# Patient Record
Sex: MALE | Race: WHITE | Employment: FULL TIME | URBAN - NONMETROPOLITAN AREA
[De-identification: names, ages, dates, MRNs, and addresses within clinical notes are randomized per-mention and may not be internally consistent; named-entity substitution may affect disease eponyms.]

---

## 2019-06-22 RX ORDER — LOSARTAN POTASSIUM 25 MG/1
25 TABLET ORAL DAILY
Qty: 90 TABLET | Refills: 1 | Status: SHIPPED | OUTPATIENT
Start: 2019-06-22 | End: 2019-07-18 | Stop reason: SDUPTHER

## 2019-07-18 ENCOUNTER — OFFICE VISIT (OUTPATIENT)
Dept: FAMILY MEDICINE CLINIC | Age: 49
End: 2019-07-18
Payer: COMMERCIAL

## 2019-07-18 VITALS
SYSTOLIC BLOOD PRESSURE: 132 MMHG | HEIGHT: 75 IN | HEART RATE: 72 BPM | WEIGHT: 258 LBS | OXYGEN SATURATION: 98 % | BODY MASS INDEX: 32.08 KG/M2 | DIASTOLIC BLOOD PRESSURE: 86 MMHG | TEMPERATURE: 97.8 F

## 2019-07-18 DIAGNOSIS — I10 ESSENTIAL HYPERTENSION: Primary | ICD-10-CM

## 2019-07-18 PROCEDURE — 99213 OFFICE O/P EST LOW 20 MIN: CPT | Performed by: FAMILY MEDICINE

## 2019-07-18 RX ORDER — MONTELUKAST SODIUM 10 MG/1
10 TABLET ORAL NIGHTLY
COMMUNITY
End: 2019-07-18 | Stop reason: SDUPTHER

## 2019-07-18 RX ORDER — SIMVASTATIN 10 MG
40 TABLET ORAL NIGHTLY
Qty: 90 TABLET | Refills: 1 | Status: SHIPPED | OUTPATIENT
Start: 2019-07-18 | End: 2019-08-17

## 2019-07-18 RX ORDER — SILDENAFIL CITRATE 20 MG/1
20 TABLET ORAL PRN
COMMUNITY
End: 2020-06-23

## 2019-07-18 RX ORDER — MONTELUKAST SODIUM 10 MG/1
10 TABLET ORAL NIGHTLY
Qty: 90 TABLET | Refills: 1 | Status: SHIPPED
Start: 2019-07-18 | End: 2020-03-02

## 2019-07-18 RX ORDER — LOSARTAN POTASSIUM 25 MG/1
50 TABLET ORAL DAILY
Qty: 90 TABLET | Refills: 1 | Status: SHIPPED | OUTPATIENT
Start: 2019-07-18 | End: 2019-08-17

## 2019-07-18 ASSESSMENT — PATIENT HEALTH QUESTIONNAIRE - PHQ9
SUM OF ALL RESPONSES TO PHQ9 QUESTIONS 1 & 2: 0
2. FEELING DOWN, DEPRESSED OR HOPELESS: 0
1. LITTLE INTEREST OR PLEASURE IN DOING THINGS: 0
SUM OF ALL RESPONSES TO PHQ QUESTIONS 1-9: 0
SUM OF ALL RESPONSES TO PHQ QUESTIONS 1-9: 0

## 2019-07-18 NOTE — PROGRESS NOTES
7/18/19    Chief Complaint   Patient presents with    Hypertension        S: patient here for PE of chronic medical problems, med recheck/refill. Changed jobs, getting new ins. No family history on file. Past Surgical History:   Procedure Laterality Date    CARDIAC CATHETERIZATION  2004    LITHOTRIPSY  2005    NASAL SINUS SURGERY      OTHER SURGICAL HISTORY  9/18/2014    FESS [facility] turbinator    SALIVARY GLAND SURGERY  2001    VASECTOMY  JAN 2014       Social History     Tobacco Use    Smoking status: Never Smoker    Smokeless tobacco: Never Used   Substance Use Topics    Alcohol use: Yes     Comment: occasional    Drug use: No       ROS:  No CP, No palpitations,   No sob, No cough,   No abd pain, No heartburn,   No headaches,   No tingling, No numbness, No weakness,   No bowel changes, No hematochezia, No melena,  No bladder changes, No hematuria  No skin rashes, No skin lesions. No vision changes, No hearing changes,   No polyuria, polydipsia, polyphagia. Stable mood. ROS otherwise negative unless as listed in HPI. Chart reviewed and updated where appropriate for PMH, Fam, and Soc Hx. Physical Exam   /86   Pulse 72   Temp 97.8 °F (36.6 °C)   Ht 6' 3\" (1.905 m)   Wt 258 lb (117 kg)   SpO2 98%   BMI 32.25 kg/m²   Wt Readings from Last 3 Encounters:   07/18/19 258 lb (117 kg)   11/05/14 250 lb (113.4 kg)   10/08/14 250 lb (113.4 kg)       Constitutional:    He is oriented to person, place, and time. He appears well-developed and well-nourished. HENT:    Right Ear: Tympanic membrane, external ear and ear canal normal.    Left Ear: Tympanic membrane, external ear and ear canal normal.    Nose: Nose normal.    Mouth/Throat: Oropharynx is clear and moist.   Eyes:    Conjunctivae are normal.    Pupils are equal, round, and reactive to light. EOMI. Neck:    Normal range of motion. No thyromegaly or nodules noted. No bruit.   Cardiovascular:    Normal rate, regular rhythm and normal heart sounds. No murmur. No gallop and no friction rub. Pulmonary/Chest:    Effort normal and breath sounds normal.    No wheezes. No rales or rhonchi. Abdominal:    Soft. Bowel sounds are normal.    No distension. No tenderness. Musculoskeletal:    Normal range of motion. No joint swelling noted. No peripheral edema. Neurological:    He is alert and oriented to person, place, and time. Motor and sensation grossly intact. Normal Gait. Skin:    Skin is warm and dry. No rashes, lesions. Psychiatric:    He has a normal mood and affect. Normal groom and dress. Current Outpatient Medications on File Prior to Visit   Medication Sig Dispense Refill    sildenafil (REVATIO) 20 MG tablet Take 20 mg by mouth as needed      Multiple Vitamins-Minerals (THERAPEUTIC MULTIVITAMIN-MINERALS) tablet Take 1 tablet by mouth daily.  fluticasone (FLONASE) 50 MCG/ACT nasal spray 2 sprays by Nasal route daily. 2 sprays each nostril once daily 1 Bottle 2     No current facility-administered medications on file prior to visit. There is no problem list on file for this patient. Assessment / Waldo Heck was seen today for hypertension. Diagnoses and all orders for this visit:    Essential hypertension    Other orders  -     losartan (COZAAR) 25 MG tablet; Take 2 tablets by mouth daily Instructed to take with sip water am of procedure  -     simvastatin (ZOCOR) 10 MG tablet; Take 4 tablets by mouth nightly  -     montelukast (SINGULAIR) 10 MG tablet; Take 1 tablet by mouth nightly  -     dexamethasone (DECADRON) 0.75 MG tablet; Take 1 tablet by mouth three times daily for 5 days    Reviewed Pmhx, meds, prior labs. Recheck end of year for fbw. No follow-ups on file. Patient counseled to follow up sooner or seek more acute care if symptoms worsening. Electronically signed by Sreekanth Ding DO on 7/18/2019    This note may have been created using dictation software.

## 2019-07-26 ENCOUNTER — TELEPHONE (OUTPATIENT)
Dept: FAMILY MEDICINE CLINIC | Age: 49
End: 2019-07-26

## 2019-08-17 ENCOUNTER — OFFICE VISIT (OUTPATIENT)
Dept: FAMILY MEDICINE CLINIC | Age: 49
End: 2019-08-17
Payer: COMMERCIAL

## 2019-08-17 VITALS
DIASTOLIC BLOOD PRESSURE: 88 MMHG | HEIGHT: 75 IN | HEART RATE: 80 BPM | SYSTOLIC BLOOD PRESSURE: 120 MMHG | BODY MASS INDEX: 32.2 KG/M2 | TEMPERATURE: 98.2 F | WEIGHT: 259 LBS | OXYGEN SATURATION: 99 %

## 2019-08-17 DIAGNOSIS — N64.59 ABNORMAL BREAST TISSUE: Primary | ICD-10-CM

## 2019-08-17 PROCEDURE — 99213 OFFICE O/P EST LOW 20 MIN: CPT | Performed by: FAMILY MEDICINE

## 2019-08-17 RX ORDER — SIMVASTATIN 40 MG
TABLET ORAL
COMMUNITY
Start: 2019-06-26 | End: 2020-03-17 | Stop reason: SDUPTHER

## 2019-08-17 RX ORDER — LOSARTAN POTASSIUM 50 MG/1
50 TABLET ORAL DAILY
COMMUNITY
Start: 2012-01-31 | End: 2019-10-08 | Stop reason: SDUPTHER

## 2019-08-17 ASSESSMENT — ENCOUNTER SYMPTOMS
COLOR CHANGE: 0
ABDOMINAL PAIN: 0
SHORTNESS OF BREATH: 0
CHEST TIGHTNESS: 0
SORE THROAT: 0
ABDOMINAL DISTENTION: 0
VOMITING: 0
BACK PAIN: 0
NAUSEA: 0
CONSTIPATION: 0
PHOTOPHOBIA: 0
DIARRHEA: 0
SINUS PRESSURE: 0
COUGH: 0
EYE REDNESS: 0
SINUS PAIN: 0
EYE ITCHING: 0
BLOOD IN STOOL: 0
EYE PAIN: 0
WHEEZING: 0

## 2019-08-17 NOTE — PROGRESS NOTES
[facility]) Physicians   Internal Medicine     2019  Guanako Escobar : 1970 Sex: male  Age:48 y.o. Chief Complaint   Patient presents with    Breast Problem     left chest is bigger than the right. stung by a jellyfish on left rib 3 weeks ago. HPI  Patient presents to urgent care with c/o stung by jellyfish 3 weeks ago on chest, wife thinks his left chest is now more prominent than right. Patient deos not agree, denies pain in left chest, redness, fever/chills  Review of Systems   Constitutional: Negative for appetite change, fatigue, fever and unexpected weight change. HENT: Negative for congestion, ear discharge, ear pain, hearing loss, mouth sores, postnasal drip, sinus pressure, sinus pain, sneezing and sore throat. Eyes: Negative for photophobia, pain, redness and itching. Respiratory: Negative for cough, chest tightness, shortness of breath and wheezing. Cardiovascular: Negative for chest pain, palpitations and leg swelling. Htn  hyperlipidemia   Gastrointestinal: Negative for abdominal distention, abdominal pain, blood in stool, constipation, diarrhea, nausea and vomiting. Endocrine: Negative for cold intolerance and heat intolerance. Genitourinary: Negative for difficulty urinating, dysuria, frequency, hematuria and urgency. Musculoskeletal: Negative for arthralgias, back pain, joint swelling, myalgias, neck pain and neck stiffness. Skin: Negative for color change, pallor and wound. Allergic/Immunologic: Negative for environmental allergies and food allergies. Neurological: Negative for dizziness, seizures, syncope, weakness, light-headedness and headaches. Hematological: Negative for adenopathy. Psychiatric/Behavioral: Negative for agitation, confusion, dysphoric mood, sleep disturbance and suicidal ideas. The patient is not nervous/anxious and is not hyperactive.           Current Outpatient Medications:     losartan (COZAAR) 50 MG tablet, Encounter   Procedures    US BREAST COMPLETE LEFT     Standing Status:   Future     Standing Expiration Date:   8/17/2020       Jason Bryant MD  8/17/2019  9:11 AM

## 2019-10-08 RX ORDER — LOSARTAN POTASSIUM 50 MG/1
50 TABLET ORAL DAILY
Qty: 30 TABLET | Refills: 0 | Status: SHIPPED | OUTPATIENT
Start: 2019-10-08 | End: 2019-10-09 | Stop reason: SDUPTHER

## 2019-10-09 RX ORDER — LOSARTAN POTASSIUM 50 MG/1
50 TABLET ORAL DAILY
Qty: 90 TABLET | Refills: 1 | Status: SHIPPED
Start: 2019-10-09 | End: 2020-03-17 | Stop reason: SDUPTHER

## 2019-11-08 ENCOUNTER — HOSPITAL ENCOUNTER (OUTPATIENT)
Dept: ULTRASOUND IMAGING | Age: 49
Discharge: HOME | End: 2019-11-08
Payer: COMMERCIAL

## 2019-11-08 DIAGNOSIS — N64.59 ABNORMAL BREAST TISSUE: ICD-10-CM

## 2019-11-08 PROCEDURE — 76641 ULTRASOUND BREAST COMPLETE: CPT

## 2020-03-02 RX ORDER — MONTELUKAST SODIUM 10 MG/1
TABLET ORAL
Qty: 90 TABLET | Refills: 0 | Status: SHIPPED
Start: 2020-03-02 | End: 2020-05-28

## 2020-03-17 RX ORDER — SIMVASTATIN 40 MG
40 TABLET ORAL NIGHTLY
Qty: 90 TABLET | Refills: 1 | Status: SHIPPED
Start: 2020-03-17 | End: 2020-06-23 | Stop reason: SDUPTHER

## 2020-03-17 NOTE — TELEPHONE ENCOUNTER
Jordan calling to get a new script for [employer] sent to [facility]. I have this ready to send. Also asking when you need to see him again?

## 2020-03-18 RX ORDER — LOSARTAN POTASSIUM 50 MG/1
50 TABLET ORAL DAILY
Qty: 90 TABLET | Refills: 1 | Status: SHIPPED
Start: 2020-03-18 | End: 2020-04-06

## 2020-04-06 RX ORDER — LOSARTAN POTASSIUM 50 MG/1
TABLET ORAL
Qty: 90 TABLET | Refills: 3 | Status: SHIPPED
Start: 2020-04-06 | End: 2020-06-23 | Stop reason: SDUPTHER

## 2020-05-28 RX ORDER — MONTELUKAST SODIUM 10 MG/1
TABLET ORAL
Qty: 90 TABLET | Refills: 3 | Status: SHIPPED
Start: 2020-05-28 | End: 2020-06-23

## 2020-06-23 ENCOUNTER — OFFICE VISIT (OUTPATIENT)
Dept: PRIMARY CARE CLINIC | Age: 50
End: 2020-06-23
Payer: COMMERCIAL

## 2020-06-23 VITALS
BODY MASS INDEX: 31.95 KG/M2 | WEIGHT: 257 LBS | SYSTOLIC BLOOD PRESSURE: 120 MMHG | DIASTOLIC BLOOD PRESSURE: 80 MMHG | OXYGEN SATURATION: 96 % | TEMPERATURE: 97.2 F | HEIGHT: 75 IN | HEART RATE: 78 BPM

## 2020-06-23 PROCEDURE — 99214 OFFICE O/P EST MOD 30 MIN: CPT | Performed by: FAMILY MEDICINE

## 2020-06-23 RX ORDER — SIMVASTATIN 40 MG
40 TABLET ORAL NIGHTLY
Qty: 90 TABLET | Refills: 3 | Status: SHIPPED
Start: 2020-06-23 | End: 2020-09-14 | Stop reason: SDUPTHER

## 2020-06-23 RX ORDER — LOSARTAN POTASSIUM 50 MG/1
TABLET ORAL
Qty: 90 TABLET | Refills: 3 | Status: SHIPPED
Start: 2020-06-23 | End: 2021-04-01

## 2020-06-23 ASSESSMENT — ENCOUNTER SYMPTOMS
COUGH: 0
ABDOMINAL PAIN: 0
CONSTIPATION: 0
VOMITING: 0
BACK PAIN: 0
NAUSEA: 0
WHEEZING: 0
DIARRHEA: 0
SHORTNESS OF BREATH: 0

## 2020-06-23 ASSESSMENT — PATIENT HEALTH QUESTIONNAIRE - PHQ9
2. FEELING DOWN, DEPRESSED OR HOPELESS: 0
SUM OF ALL RESPONSES TO PHQ QUESTIONS 1-9: 0
SUM OF ALL RESPONSES TO PHQ9 QUESTIONS 1 & 2: 0
SUM OF ALL RESPONSES TO PHQ QUESTIONS 1-9: 0
1. LITTLE INTEREST OR PLEASURE IN DOING THINGS: 0

## 2020-06-23 NOTE — PROGRESS NOTES
INJECTIONS    Hyperlipidemia     Hypertension     Sinusitis      Past Surgical History:   Procedure Laterality Date    CARDIAC CATHETERIZATION  2004    LITHOTRIPSY  2005    NASAL SINUS SURGERY      OTHER SURGICAL HISTORY  9/18/2014    FESS [facility] turbinator    SALIVARY GLAND SURGERY  2001    VASECTOMY  JAN 2014     History reviewed. No pertinent family history.   Social History     Socioeconomic History    Marital status:      Spouse name: Not on file    Number of children: Not on file    Years of education: Not on file    Highest education level: Not on file   Occupational History    Not on file   Social Needs    Financial resource strain: Not on file    Food insecurity     Worry: Not on file     Inability: Not on file    Transportation needs     Medical: Not on file     Non-medical: Not on file   Tobacco Use    Smoking status: Never Smoker    Smokeless tobacco: Never Used   Substance and Sexual Activity    Alcohol use: Yes     Comment: occasional    Drug use: No    Sexual activity: Not on file   Lifestyle    Physical activity     Days per week: Not on file     Minutes per session: Not on file    Stress: Not on file   Relationships    Social connections     Talks on phone: Not on file     Gets together: Not on file     Attends [employer] service: Not on file     Active member of club or organization: Not on file     Attends meetings of clubs or organizations: Not on file     Relationship status: Not on file    Intimate partner violence     Fear of current or ex partner: Not on file     Emotionally abused: Not on file     Physically abused: Not on file     Forced sexual activity: Not on file   Other Topics Concern    Not on file   Social History Narrative    Not on file       Vitals:    06/23/20 1603   BP: 120/80   Pulse: 78   Temp: 97.2 °F (36.2 °C)   SpO2: 96%   Weight: 257 lb (116.6 kg)   Height: 6' 3\" (1.905 m)       Physical Exam:  Physical Exam  Vitals signs and nursing note

## 2020-08-29 LAB
ALBUMIN SERPL-MCNC: 4.4 G/DL
ALP BLD-CCNC: 74 U/L
ALT SERPL-CCNC: 5.4 U/L
ANION GAP SERPL CALCULATED.3IONS-SCNC: NORMAL MMOL/L
AST SERPL-CCNC: 26 U/L
AVERAGE GLUCOSE: NORMAL
BASOPHILS ABSOLUTE: 237 /ΜL
BASOPHILS RELATIVE PERCENT: 0.9 %
BILIRUB SERPL-MCNC: 0.6 MG/DL (ref 0.1–1.4)
BUN BLDV-MCNC: 13 MG/DL
CALCIUM SERPL-MCNC: 9.8 MG/DL
CHLORIDE BLD-SCNC: 104 MMOL/L
CHOLESTEROL, TOTAL: 136 MG/DL
CHOLESTEROL/HDL RATIO: 3.1
CO2: 31 MMOL/L
CREAT SERPL-MCNC: 0.99 MG/DL
EOSINOPHILS ABSOLUTE: 659 /ΜL
EOSINOPHILS RELATIVE PERCENT: 3.7 %
GFR CALCULATED: 88
GLUCOSE BLD-MCNC: 102 MG/DL
HBA1C MFR BLD: 5.4 %
HCT VFR BLD CALC: 47.6 % (ref 41–53)
HDLC SERPL-MCNC: 44 MG/DL (ref 35–70)
HEMOGLOBIN: 16 G/DL (ref 13.5–17.5)
LDL CHOLESTEROL CALCULATED: 74 MG/DL (ref 0–160)
LYMPHOCYTES ABSOLUTE: 2931 /ΜL
LYMPHOCYTES RELATIVE PERCENT: 39.3 %
MCH RBC QN AUTO: 30 PG
MCHC RBC AUTO-ENTMCNC: 33.6 G/DL
MCV RBC AUTO: 89.1 FL
MONOCYTES ABSOLUTE: 2515 /ΜL
MONOCYTES RELATIVE PERCENT: 10.3 %
NEUTROPHILS ABSOLUTE: 0.3 /ΜL
NEUTROPHILS RELATIVE PERCENT: 45.8 %
NONHDLC SERPL-MCNC: 92 MG/DL
PLATELET # BLD: 229 K/ΜL
PMV BLD AUTO: 9.4 FL
POTASSIUM SERPL-SCNC: 4.7 MMOL/L
RBC # BLD: 58 10^6/ΜL
SODIUM BLD-SCNC: 140 MMOL/L
TOTAL PROTEIN: 7.3
TRIGL SERPL-MCNC: 95 MG/DL
TSH SERPL DL<=0.05 MIU/L-ACNC: 1.79 UIU/ML
URIC ACID: 6.6
VITAMIN D 25-HYDROXY: 34
VITAMIN D2, 25 HYDROXY: NORMAL
VITAMIN D3,25 HYDROXY: NORMAL
VLDLC SERPL CALC-MCNC: NORMAL MG/DL
WBC # BLD: 6.4 10^3/ML

## 2020-09-08 ENCOUNTER — TELEPHONE (OUTPATIENT)
Dept: PRIMARY CARE CLINIC | Age: 50
End: 2020-09-08

## 2020-09-14 RX ORDER — SIMVASTATIN 40 MG
40 TABLET ORAL NIGHTLY
Qty: 90 TABLET | Refills: 1 | Status: SHIPPED
Start: 2020-09-14 | End: 2021-03-14

## 2020-09-14 RX ORDER — SIMVASTATIN 40 MG
40 TABLET ORAL NIGHTLY
Qty: 30 TABLET | Refills: 0 | Status: SHIPPED
Start: 2020-09-14 | End: 2022-02-14

## 2020-09-14 NOTE — TELEPHONE ENCOUNTER
Pt is needing 1 month script of Simvastatin to [facility] which I pended and he also needs 90 days sent to mail away which I could not pend

## 2020-12-03 ENCOUNTER — TELEPHONE (OUTPATIENT)
Dept: FAMILY MEDICINE CLINIC | Age: 50
End: 2020-12-03

## 2020-12-03 RX ORDER — AZITHROMYCIN 250 MG/1
TABLET, FILM COATED ORAL
Qty: 6 TABLET | Refills: 0 | Status: SHIPPED | OUTPATIENT
Start: 2020-12-03 | End: 2020-12-08

## 2020-12-03 RX ORDER — PREDNISONE 10 MG/1
TABLET ORAL
Qty: 30 TABLET | Refills: 0 | Status: SHIPPED | OUTPATIENT
Start: 2020-12-03 | End: 2020-12-15

## 2020-12-21 ENCOUNTER — TELEPHONE (OUTPATIENT)
Dept: PRIMARY CARE CLINIC | Age: 50
End: 2020-12-21

## 2020-12-21 RX ORDER — PANTOPRAZOLE SODIUM 40 MG/1
40 TABLET, DELAYED RELEASE ORAL
Qty: 90 TABLET | Refills: 1 | Status: SHIPPED
Start: 2020-12-21 | End: 2021-06-07

## 2020-12-21 NOTE — TELEPHONE ENCOUNTER
Patient called and stated he Tested psitive for Covid on 12/01. He has not been tested since. He has had Heartburn since then and a pain in his chest.( Feels like Heartburn)  He stated he does not have any SOB. He is having some difficulty swallowing. No Availability and he does not want to go to the [facility] Clin[facility]. He would like to talk to his PCP. Please advise.

## 2020-12-21 NOTE — TELEPHONE ENCOUNTER
Spoke with patient. Not having chest pain and has been going on prior to COVID dx. No symptoms of COVID. Feels like when he eats, foods are getting stuck in distal esophagus. Isn't sure if stress or GERD related. Will start Protonix, check esophagram and then seen patient in January. Please call patient and schedule him in January.

## 2020-12-22 NOTE — TELEPHONE ENCOUNTER
Pt called back is scheduled--Jan 14 @3PM.  Please be advised pt stated he did positive for Covid 19 at the beginning of Dec. He finished his quarantine and is already back to work. Just wanted to make sure the office is aware. PT wants to schedule esophogram test Dr Toya London mentioned below, please contact pt at his direct work no. [phone].       Thank you

## 2020-12-22 NOTE — TELEPHONE ENCOUNTER
Patient called in returning missed phone call. Patient states telephone lines not working at office. Patient request someone to please call him again.

## 2020-12-28 ENCOUNTER — HOSPITAL ENCOUNTER (OUTPATIENT)
Dept: GENERAL RADIOLOGY | Age: 50
Discharge: HOME | End: 2020-12-30
Payer: COMMERCIAL

## 2020-12-28 PROCEDURE — 74220 X-RAY XM ESOPHAGUS 1CNTRST: CPT

## 2020-12-28 PROCEDURE — 2500000003 HC RX 250 WO HCPCS: Performed by: RADIOLOGY

## 2020-12-28 RX ADMIN — BARIUM SULFATE 88 G: 960 POWDER, FOR SUSPENSION ORAL at 09:21

## 2020-12-28 RX ADMIN — BARIUM SULFATE 140 ML: 980 POWDER, FOR SUSPENSION ORAL at 09:21

## 2021-03-13 DIAGNOSIS — E78.2 MIXED HYPERLIPIDEMIA: ICD-10-CM

## 2021-03-14 RX ORDER — SIMVASTATIN 40 MG
TABLET ORAL
Qty: 90 TABLET | Refills: 3 | Status: SHIPPED
Start: 2021-03-14 | End: 2021-06-07 | Stop reason: SDUPTHER

## 2021-04-01 DIAGNOSIS — I10 BENIGN ESSENTIAL HYPERTENSION: ICD-10-CM

## 2021-04-01 RX ORDER — LOSARTAN POTASSIUM 50 MG/1
TABLET ORAL
Qty: 90 TABLET | Refills: 3 | Status: SHIPPED
Start: 2021-04-01 | End: 2022-08-23

## 2021-05-13 ENCOUNTER — PATIENT MESSAGE (OUTPATIENT)
Dept: PRIMARY CARE CLINIC | Age: 51
End: 2021-05-13

## 2021-05-13 DIAGNOSIS — I10 BENIGN ESSENTIAL HYPERTENSION: Primary | ICD-10-CM

## 2021-05-13 DIAGNOSIS — E55.9 VITAMIN D DEFICIENCY: ICD-10-CM

## 2021-05-13 DIAGNOSIS — Z12.5 SCREENING FOR PROSTATE CANCER: ICD-10-CM

## 2021-05-13 DIAGNOSIS — R73.01 IMPAIRED FASTING GLUCOSE: ICD-10-CM

## 2021-05-17 LAB
ALBUMIN SERPL-MCNC: 4.4 G/DL
ALP BLD-CCNC: 67 U/L
ALT SERPL-CCNC: 24 U/L
ANION GAP SERPL CALCULATED.3IONS-SCNC: NORMAL MMOL/L
AST SERPL-CCNC: 21 U/L
AVERAGE GLUCOSE: NORMAL
BASOPHILS ABSOLUTE: 39 /ΜL
BASOPHILS RELATIVE PERCENT: 0.7 %
BILIRUB SERPL-MCNC: 0.6 MG/DL (ref 0.1–1.4)
BILIRUBIN, URINE: NEGATIVE
BLOOD, URINE: NEGATIVE
BUN BLDV-MCNC: 14 MG/DL
CALCIUM SERPL-MCNC: 9.5 MG/DL
CHLORIDE BLD-SCNC: 106 MMOL/L
CHOLESTEROL, TOTAL: 140 MG/DL
CHOLESTEROL/HDL RATIO: 3.3
CLARITY: CLEAR
CO2: 28 MMOL/L
COLOR: YELLOW
CREAT SERPL-MCNC: 0.99 MG/DL
EOSINOPHILS ABSOLUTE: 157 /ΜL
EOSINOPHILS RELATIVE PERCENT: 2.8 %
GFR CALCULATED: 88
GLUCOSE BLD-MCNC: 101 MG/DL
GLUCOSE URINE: NEGATIVE
HBA1C MFR BLD: 5.3 %
HCT VFR BLD CALC: 46.1 % (ref 41–53)
HDLC SERPL-MCNC: 42 MG/DL (ref 35–70)
HEMOGLOBIN: 15.7 G/DL (ref 13.5–17.5)
KETONES, URINE: NEGATIVE
LDL CHOLESTEROL CALCULATED: 79 MG/DL (ref 0–160)
LEUKOCYTE ESTERASE, URINE: NEGATIVE
LYMPHOCYTES ABSOLUTE: 2391 /ΜL
LYMPHOCYTES RELATIVE PERCENT: 42.7 %
MCH RBC QN AUTO: 31 PG
MCHC RBC AUTO-ENTMCNC: 34.1 G/DL
MCV RBC AUTO: 91.1 FL
MONOCYTES ABSOLUTE: 610 /ΜL
MONOCYTES RELATIVE PERCENT: 10.9 %
NEUTROPHILS ABSOLUTE: 2402 /ΜL
NEUTROPHILS RELATIVE PERCENT: 42.9 %
NITRITE, URINE: NEGATIVE
NONHDLC SERPL-MCNC: 98 MG/DL
PDW BLD-RTO: 13.2 %
PH UA: 6 (ref 4.5–8)
PLATELET # BLD: 206 K/ΜL
PMV BLD AUTO: 9.3 FL
POTASSIUM SERPL-SCNC: 4.7 MMOL/L
PROTEIN UA: NEGATIVE
RBC # BLD: 5.06 10^6/ΜL
SODIUM BLD-SCNC: 139 MMOL/L
SPECIFIC GRAVITY, URINE: 1.01
TOTAL PROTEIN: 6.9
TRIGL SERPL-MCNC: 106 MG/DL
TSH SERPL DL<=0.05 MIU/L-ACNC: 2.47 UIU/ML
UROBILINOGEN, URINE: NORMAL
VITAMIN D 25-HYDROXY: 32
VITAMIN D2, 25 HYDROXY: NORMAL
VITAMIN D3,25 HYDROXY: NORMAL
VLDLC SERPL CALC-MCNC: NORMAL MG/DL
WBC # BLD: 5.6 10^3/ML

## 2021-05-18 ENCOUNTER — HOSPITAL ENCOUNTER (EMERGENCY)
Age: 51
Discharge: HOME | End: 2021-05-18
Payer: COMMERCIAL

## 2021-05-18 ENCOUNTER — OFFICE VISIT (OUTPATIENT)
Dept: FAMILY MEDICINE CLINIC | Age: 51
End: 2021-05-18
Payer: COMMERCIAL

## 2021-05-18 VITALS
TEMPERATURE: 96.8 F | HEIGHT: 75 IN | SYSTOLIC BLOOD PRESSURE: 126 MMHG | HEART RATE: 83 BPM | DIASTOLIC BLOOD PRESSURE: 82 MMHG | RESPIRATION RATE: 18 BRPM | WEIGHT: 245 LBS | BODY MASS INDEX: 30.46 KG/M2 | OXYGEN SATURATION: 98 %

## 2021-05-18 DIAGNOSIS — E55.9 VITAMIN D DEFICIENCY: ICD-10-CM

## 2021-05-18 DIAGNOSIS — I10 BENIGN ESSENTIAL HYPERTENSION: ICD-10-CM

## 2021-05-18 DIAGNOSIS — R73.01 IMPAIRED FASTING GLUCOSE: ICD-10-CM

## 2021-05-18 DIAGNOSIS — N39.0 URINARY TRACT INFECTION WITHOUT HEMATURIA, SITE UNSPECIFIED: Primary | ICD-10-CM

## 2021-05-18 PROCEDURE — 99213 OFFICE O/P EST LOW 20 MIN: CPT | Performed by: FAMILY MEDICINE

## 2021-05-18 RX ORDER — CIPROFLOXACIN 500 MG/1
500 TABLET, FILM COATED ORAL 2 TIMES DAILY
Qty: 14 TABLET | Refills: 0 | Status: SHIPPED
Start: 2021-05-18 | End: 2021-05-21 | Stop reason: ALTCHOICE

## 2021-05-18 RX ORDER — MONTELUKAST SODIUM 10 MG/1
TABLET ORAL
COMMUNITY
Start: 2021-02-22 | End: 2021-05-18 | Stop reason: SDUPTHER

## 2021-05-18 RX ORDER — MONTELUKAST SODIUM 10 MG/1
10 TABLET ORAL NIGHTLY
Qty: 90 TABLET | Refills: 1 | Status: SHIPPED
Start: 2021-05-18 | End: 2021-05-24

## 2021-05-18 NOTE — PROGRESS NOTES
21  Devaughn Cranker : 1970 Sex: male  Age: 48 y.o. Assessment and Plan:  Tien Daniel was seen today for abdominal pain. Diagnoses and all orders for this visit:    Urinary tract infection without hematuria, site unspecified  -     ciprofloxacin (CIPRO) 500 MG tablet; Take 1 tablet by mouth 2 times daily for 7 days  He presents a mixed picture. He has left lower quadrant discomfort but no bowel issues with this. Has boring pain in the pelvis which could be indicative of prostatitis,  but the urinanalysis is essentially normal, culture however is pending. We will treat like a urinary tract infection at this time, most likely prostatitis. Follow with Lyn in 7 to 10 days or rechecked immediately if complaints worsen. Return in about 10 days (around 2021). Chief Complaint   Patient presents with    Abdominal Pain     lower right side into groin. . patient stated feels like a pulled muscle        The patient states that they have had abdominal pain for the last 4 days. The pain is described as aching and is located in the [facility] with radiation to pelvis he describes the pain as a dull sensation that pelvic area. He just had blood work Dr. Lico Pulido,  culture of the urine is pending. The patient denies nausea and vomiting. Bowel movements have been normal color and consistency. No diarrhea. No black or bloody stools. The patient denies dysuria, urinary frequency, urgency and or gross hematuria. No flank pain. No fevers or chills. Kidney stones in the past, this pain is not to restrict of that. Review of Systems   Constitutional: Negative for appetite change, fatigue and unexpected weight change. HENT: Negative for congestion, ear pain, hearing loss, sinus pain, sore throat and trouble swallowing. Eyes: Negative for photophobia, pain, discharge and redness. Respiratory: Negative for cough, chest tightness and shortness of breath.     Cardiovascular: Negative for chest pain, palpitations and leg swelling. Gastrointestinal: Positive for abdominal pain. Negative for blood in stool, diarrhea, nausea and vomiting. Endocrine: Negative. Genitourinary: Negative for dysuria, flank pain, frequency and hematuria. Musculoskeletal: Negative for arthralgias, back pain, joint swelling and myalgias. Skin: Negative. Allergic/Immunologic: Negative. Neurological: Negative for dizziness, seizures, syncope, weakness, light-headedness, numbness and headaches. Hematological: Negative for adenopathy. Does not bruise/bleed easily. Psychiatric/Behavioral: Negative. Current Outpatient Medications:     ciprofloxacin (CIPRO) 500 MG tablet, Take 1 tablet by mouth 2 times daily for 7 days, Disp: 14 tablet, Rfl: 0    losartan (COZAAR) 50 MG tablet, TAKE 1 TABLET DAILY, Disp: 90 tablet, Rfl: 3    simvastatin (ZOCOR) 40 MG tablet, TAKE 1 TABLET NIGHTLY, Disp: 90 tablet, Rfl: 3    pantoprazole (PROTONIX) 40 MG tablet, Take 1 tablet by mouth every morning (before breakfast), Disp: 90 tablet, Rfl: 1    simvastatin (ZOCOR) 40 MG tablet, Take 1 tablet by mouth nightly, Disp: 30 tablet, Rfl: 0    Multiple Vitamins-Minerals (THERAPEUTIC MULTIVITAMIN-MINERALS) tablet, Take 1 tablet by mouth daily. , Disp: , Rfl:     montelukast (SINGULAIR) 10 MG tablet, Take 1 tablet by mouth nightly, Disp: 90 tablet, Rfl: 1  No Known Allergies    Past Medical History:   Diagnosis Date    Cervical (neck) region somatic dysfunction 2010    3 HERNIATED DISKS & HAS HAD CORTIZONE INJECTIONS    Hyperlipidemia     Hypertension     Sinusitis      Past Surgical History:   Procedure Laterality Date    CARDIAC CATHETERIZATION  2004    LITHOTRIPSY  2005    NASAL SINUS SURGERY      OTHER SURGICAL HISTORY  9/18/2014    FESS SMF turbinator    SALIVARY GLAND SURGERY  2001    VASECTOMY  JAN 2014     No family history on file.   Social History     Socioeconomic History    Marital status:  Spouse name: Not on file    Number of children: Not on file    Years of education: Not on file    Highest education level: Not on file   Occupational History    Not on file   Tobacco Use    Smoking status: Never Smoker    Smokeless tobacco: Never Used   Substance and Sexual Activity    Alcohol use: Yes     Comment: occasional    Drug use: No    Sexual activity: Not on file   Other Topics Concern    Not on file   Social History Narrative    Not on file     Social Determinants of Health     Financial Resource Strain:     Difficulty of Paying Living Expenses:    Food Insecurity:     Worried About Running Out of Food in the Last Year:     [address] in the Last Year:    Transportation Needs:     Lack of Transportation (Medical):  Lack of Transportation (Non-Medical):    Physical Activity:     Days of Exercise per Week:     Minutes of Exercise per Session:    Stress:     Feeling of Stress :    Social Connections:     Frequency of Communication with Friends and Family:     Frequency of Social Gatherings with Friends and Family:     Attends Sabian[employer] Services:     Active Member of Clubs or Organizations:     Attends Club or Organization Meetings:     Marital Status:    Intimate Partner Violence:     Fear of Current or Ex-Partner:     Emotionally Abused:     Physically Abused:     Sexually Abused:        Vitals:    05/18/21 1640   BP: 126/82   Pulse: 83   Resp: 18   Temp: 96.8 °F (36 °C)   SpO2: 98%   Weight: 245 lb (111.1 kg)   Height: 6' 3\" (1.905 m)       Physical Exam  Vitals and nursing note reviewed. Constitutional:       Appearance: He is well-developed. HENT:      Head: Atraumatic. Right Ear: External ear normal.      Left Ear: External ear normal.      Nose: Nose normal.      Mouth/Throat:      Pharynx: No oropharyngeal exudate. Eyes:      Conjunctiva/sclera: Conjunctivae normal.      Pupils: Pupils are equal, round, and reactive to light.    Neck:      Thyroid: No thyromegaly. Trachea: No tracheal deviation. Cardiovascular:      Rate and Rhythm: Normal rate and regular rhythm. Heart sounds: No murmur heard. No friction rub. No gallop. Pulmonary:      Effort: Pulmonary effort is normal. No respiratory distress. Breath sounds: Normal breath sounds. Abdominal:      General: Bowel sounds are normal.      Palpations: Abdomen is soft. Comments: There is a dull ache on palpation of the right lower quadrant into the groin. No rebound or guarding on palpation. Musculoskeletal:         General: No tenderness or deformity. Normal range of motion. Cervical back: Normal range of motion and neck supple. Lymphadenopathy:      Cervical: No cervical adenopathy. Skin:     General: Skin is warm and dry. Capillary Refill: Capillary refill takes less than 2 seconds. Findings: No rash. Neurological:      Mental Status: He is alert and oriented to person, place, and time. Sensory: No sensory deficit. Motor: No abnormal muscle tone.       Coordination: Coordination normal.      Deep Tendon Reflexes: Reflexes normal.             Seen By:  Ivonne Rucker DO

## 2021-05-19 ASSESSMENT — ENCOUNTER SYMPTOMS
TROUBLE SWALLOWING: 0
PHOTOPHOBIA: 0
EYE DISCHARGE: 0
BACK PAIN: 0
VOMITING: 0
ABDOMINAL PAIN: 1
SINUS PAIN: 0
CHEST TIGHTNESS: 0
COUGH: 0
BLOOD IN STOOL: 0
EYE PAIN: 0
NAUSEA: 0
ALLERGIC/IMMUNOLOGIC NEGATIVE: 1
EYE REDNESS: 0
SHORTNESS OF BREATH: 0
DIARRHEA: 0
SORE THROAT: 0

## 2021-05-21 ENCOUNTER — TELEPHONE (OUTPATIENT)
Dept: PRIMARY CARE CLINIC | Age: 51
End: 2021-05-21

## 2021-05-21 ENCOUNTER — HOSPITAL ENCOUNTER (EMERGENCY)
Age: 51
Discharge: HOME | End: 2021-05-21
Attending: EMERGENCY MEDICINE
Payer: COMMERCIAL

## 2021-05-21 ENCOUNTER — APPOINTMENT (OUTPATIENT)
Dept: CT IMAGING | Age: 51
End: 2021-05-21
Payer: COMMERCIAL

## 2021-05-21 VITALS
OXYGEN SATURATION: 98 % | TEMPERATURE: 98 F | RESPIRATION RATE: 16 BRPM | DIASTOLIC BLOOD PRESSURE: 70 MMHG | SYSTOLIC BLOOD PRESSURE: 146 MMHG | HEART RATE: 85 BPM

## 2021-05-21 DIAGNOSIS — K57.32 DIVERTICULITIS OF COLON: Primary | ICD-10-CM

## 2021-05-21 LAB
ALBUMIN SERPL-MCNC: 4.5 G/DL (ref 3.5–5.2)
ALP BLD-CCNC: 71 U/L (ref 40–129)
ALT SERPL-CCNC: 28 U/L (ref 0–40)
ANION GAP SERPL CALCULATED.3IONS-SCNC: 9 MMOL/L (ref 7–16)
AST SERPL-CCNC: 28 U/L (ref 0–39)
BACTERIA: NORMAL /HPF
BASOPHILS ABSOLUTE: 0.04 E9/L (ref 0–0.2)
BASOPHILS RELATIVE PERCENT: 0.4 % (ref 0–2)
BILIRUB SERPL-MCNC: 0.6 MG/DL (ref 0–1.2)
BILIRUBIN URINE: NEGATIVE
BLOOD, URINE: NEGATIVE
BUN BLDV-MCNC: 12 MG/DL (ref 6–20)
CALCIUM SERPL-MCNC: 10.1 MG/DL (ref 8.6–10.2)
CHLORIDE BLD-SCNC: 103 MMOL/L (ref 98–107)
CLARITY: CLEAR
CO2: 26 MMOL/L (ref 22–29)
COLOR: YELLOW
CREAT SERPL-MCNC: 1 MG/DL (ref 0.7–1.2)
EOSINOPHILS ABSOLUTE: 0.06 E9/L (ref 0.05–0.5)
EOSINOPHILS RELATIVE PERCENT: 0.6 % (ref 0–6)
EPITHELIAL CELLS, UA: NORMAL /HPF
GFR AFRICAN AMERICAN: >60
GFR NON-AFRICAN AMERICAN: >60 ML/MIN/1.73
GLUCOSE BLD-MCNC: 119 MG/DL (ref 74–99)
GLUCOSE URINE: NEGATIVE MG/DL
HCT VFR BLD CALC: 44.4 % (ref 37–54)
HEMOGLOBIN: 15.6 G/DL (ref 12.5–16.5)
IMMATURE GRANULOCYTES #: 0.03 E9/L
IMMATURE GRANULOCYTES %: 0.3 % (ref 0–5)
KETONES, URINE: NEGATIVE MG/DL
LACTIC ACID: 2.1 MMOL/L (ref 0.5–2.2)
LEUKOCYTE ESTERASE, URINE: NEGATIVE
LIPASE: 32 U/L (ref 13–60)
LYMPHOCYTES ABSOLUTE: 1.53 E9/L (ref 1.5–4)
LYMPHOCYTES RELATIVE PERCENT: 14.2 % (ref 20–42)
MCH RBC QN AUTO: 31.8 PG (ref 26–35)
MCHC RBC AUTO-ENTMCNC: 35.1 % (ref 32–34.5)
MCV RBC AUTO: 90.6 FL (ref 80–99.9)
MONOCYTES ABSOLUTE: 0.77 E9/L (ref 0.1–0.95)
MONOCYTES RELATIVE PERCENT: 7.1 % (ref 2–12)
NEUTROPHILS ABSOLUTE: 8.36 E9/L (ref 1.8–7.3)
NEUTROPHILS RELATIVE PERCENT: 77.4 % (ref 43–80)
NITRITE, URINE: NEGATIVE
PDW BLD-RTO: 12.9 FL (ref 11.5–15)
PH UA: 6 (ref 5–9)
PLATELET # BLD: 207 E9/L (ref 130–450)
PMV BLD AUTO: 9.1 FL (ref 7–12)
POTASSIUM SERPL-SCNC: 4.2 MMOL/L (ref 3.5–5)
PROTEIN UA: NEGATIVE MG/DL
RBC # BLD: 4.9 E12/L (ref 3.8–5.8)
RBC UA: NORMAL /HPF (ref 0–2)
SODIUM BLD-SCNC: 138 MMOL/L (ref 132–146)
SPECIFIC GRAVITY UA: 1.02 (ref 1–1.03)
TOTAL PROTEIN: 7.7 G/DL (ref 6.4–8.3)
UROBILINOGEN, URINE: 0.2 E.U./DL
WBC # BLD: 10.8 E9/L (ref 4.5–11.5)
WBC UA: NORMAL /HPF (ref 0–5)

## 2021-05-21 PROCEDURE — 99283 EMERGENCY DEPT VISIT LOW MDM: CPT

## 2021-05-21 PROCEDURE — 85025 COMPLETE CBC W/AUTO DIFF WBC: CPT

## 2021-05-21 PROCEDURE — 36415 COLL VENOUS BLD VENIPUNCTURE: CPT

## 2021-05-21 PROCEDURE — 83690 ASSAY OF LIPASE: CPT

## 2021-05-21 PROCEDURE — 74177 CT ABD & PELVIS W/CONTRAST: CPT

## 2021-05-21 PROCEDURE — 2580000003 HC RX 258: Performed by: EMERGENCY MEDICINE

## 2021-05-21 PROCEDURE — 96374 THER/PROPH/DIAG INJ IV PUSH: CPT

## 2021-05-21 PROCEDURE — 83605 ASSAY OF LACTIC ACID: CPT

## 2021-05-21 PROCEDURE — 6360000002 HC RX W HCPCS: Performed by: EMERGENCY MEDICINE

## 2021-05-21 PROCEDURE — 6360000004 HC RX CONTRAST MEDICATION: Performed by: RADIOLOGY

## 2021-05-21 PROCEDURE — 80053 COMPREHEN METABOLIC PANEL: CPT

## 2021-05-21 PROCEDURE — 81001 URINALYSIS AUTO W/SCOPE: CPT

## 2021-05-21 RX ORDER — CEFDINIR 300 MG/1
300 CAPSULE ORAL 2 TIMES DAILY
Qty: 20 CAPSULE | Refills: 0 | Status: SHIPPED | OUTPATIENT
Start: 2021-05-21 | End: 2021-05-31

## 2021-05-21 RX ORDER — METRONIDAZOLE 500 MG/1
500 TABLET ORAL 3 TIMES DAILY
Qty: 30 TABLET | Refills: 0 | Status: SHIPPED | OUTPATIENT
Start: 2021-05-21 | End: 2021-05-31

## 2021-05-21 RX ORDER — 0.9 % SODIUM CHLORIDE 0.9 %
1000 INTRAVENOUS SOLUTION INTRAVENOUS ONCE
Status: COMPLETED | OUTPATIENT
Start: 2021-05-21 | End: 2021-05-21

## 2021-05-21 RX ORDER — KETOROLAC TROMETHAMINE 30 MG/ML
30 INJECTION, SOLUTION INTRAMUSCULAR; INTRAVENOUS ONCE
Status: COMPLETED | OUTPATIENT
Start: 2021-05-21 | End: 2021-05-21

## 2021-05-21 RX ADMIN — SODIUM CHLORIDE 1000 ML: 9 INJECTION, SOLUTION INTRAVENOUS at 10:47

## 2021-05-21 RX ADMIN — KETOROLAC TROMETHAMINE 30 MG: 30 INJECTION, SOLUTION INTRAMUSCULAR; INTRAVENOUS at 10:47

## 2021-05-21 RX ADMIN — IOPAMIDOL 75 ML: 755 INJECTION, SOLUTION INTRAVENOUS at 11:51

## 2021-05-21 ASSESSMENT — PAIN DESCRIPTION - LOCATION: LOCATION: ABDOMEN

## 2021-05-21 ASSESSMENT — PAIN SCALES - GENERAL: PAINLEVEL_OUTOF10: 10

## 2021-05-21 NOTE — TELEPHONE ENCOUNTER
Please advise where you would like to schedule patient in at. . No availability in [facility] [place] [facility] next week and [facility] there are only same days available. Thank you!

## 2021-05-21 NOTE — ED PROVIDER NOTES
Hematocrit 44.4 37.0 - 54.0 %    MCV 90.6 80.0 - 99.9 fL    MCH 31.8 26.0 - 35.0 pg    MCHC 35.1 (H) 32.0 - 34.5 %    RDW 12.9 11.5 - 15.0 fL    Platelets 476 626 - 524 E9/L    MPV 9.1 7.0 - 12.0 fL    Neutrophils % 77.4 43.0 - 80.0 %    Immature Granulocytes % 0.3 0.0 - 5.0 %    Lymphocytes % 14.2 (L) 20.0 - 42.0 %    Monocytes % 7.1 2.0 - 12.0 %    Eosinophils % 0.6 0.0 - 6.0 %    Basophils % 0.4 0.0 - 2.0 %    Neutrophils Absolute 8.36 (H) 1.80 - 7.30 E9/L    Immature Granulocytes # 0.03 E9/L    Lymphocytes Absolute 1.53 1.50 - 4.00 E9/L    Monocytes Absolute 0.77 0.10 - 0.95 E9/L    Eosinophils Absolute 0.06 0.05 - 0.50 E9/L    Basophils Absolute 0.04 0.00 - 0.20 E9/L   Comprehensive Metabolic Panel   Result Value Ref Range    Sodium 138 132 - 146 mmol/L    Potassium 4.2 3.5 - 5.0 mmol/L    Chloride 103 98 - 107 mmol/L    CO2 26 22 - 29 mmol/L    Anion Gap 9 7 - 16 mmol/L    Glucose 119 (H) 74 - 99 mg/dL    BUN 12 6 - 20 mg/dL    CREATININE 1.0 0.7 - 1.2 mg/dL    GFR Non-African American >60 >=60 mL/min/1.73    GFR African American >60     Calcium 10.1 8.6 - 10.2 mg/dL    Total Protein 7.7 6.4 - 8.3 g/dL    Albumin 4.5 3.5 - 5.2 g/dL    Total Bilirubin 0.6 0.0 - 1.2 mg/dL    Alkaline Phosphatase 71 40 - 129 U/L    ALT 28 0 - 40 U/L    AST 28 0 - 39 U/L   Lactic Acid, Plasma   Result Value Ref Range    Lactic Acid 2.1 0.5 - 2.2 mmol/L   Lipase   Result Value Ref Range    Lipase 32 13 - 60 U/L       RADIOLOGY:  Interpreted by Radiologist.  CT ABDOMEN PELVIS W IV CONTRAST Additional Contrast? None   Final Result   Minimal inflammation of 1 of the sigmoid diverticula in the anterior abdomen,   compatible with mild acute diverticulitis. No extraluminal air or abscess. Nonobstructing left renal calculi               ------------------------- NURSING NOTES AND VITALS REVIEWED ---------------------------  The nursing notes within the ED encounter and vital signs as below have been reviewed by myself.     BP (!) 146/70   Pulse 85   Temp 98 °F (36.7 °C) (Skin)   Resp 16   SpO2 98%   Oxygen Saturation Interpretation: Normal      The patients available past medical records and past encounters were reviewed. ------------------------------ ED COURSE/MEDICAL DECISION MAKING----------------------  Medications   ketorolac (TORADOL) injection 30 mg (30 mg Intravenous Given 5/21/21 1047)   0.9 % sodium chloride bolus (1,000 mLs Intravenous New Bag 5/21/21 1047)   iopamidol (ISOVUE-370) 76 % injection 75 mL (75 mLs Intravenous Given 5/21/21 1151)           Procedures:   none      Medical Decision Making:    Acute diverticulitis, labs WNL, no N/V/D in ER. Stop cipro, start omnicef and flagyl. F/U with PCP   Patient was explicitly instructed on specific signs and symptoms on which to return to the emergency room for. Patient was instructed to return to the ER for any new or worsening symptoms. Additional discharge instructions were given verbally. All questions were answered. Patient is comfortable and agreeable with discharge plan. Patient in no acute distress and non-toxic in appearance. This patient's ED course included: re-evaluation prior to disposition, IV medications and a personal history and physicial eaxmination    This patient has remained hemodynamically stable, improved and been closely monitored during their ED course. Re-Evaluations:  Time: 12:59 PM EDT   Re-evaluation. Patients symptoms are improving  Repeat physical examination is not changed      Consultations:   none    Critical Care: none    Francis RODRIGUEZ, DO, am the Primary Provider of Record    Counseling: The emergency provider has spoken with the patient and father and discussed todays results, in addition to providing specific details for the plan of care and counseling regarding the diagnosis and prognosis.   Questions are answered at this time and they are agreeable with the plan.    --------------------------- IMPRESSION AND DISPOSITION ---------------------------------    IMPRESSION  1.  Diverticulitis of colon        DISPOSITION  Disposition: Discharge to home  Patient condition is stable             Alexis Moeller DO  05/21/21 1300

## 2021-05-24 RX ORDER — MONTELUKAST SODIUM 10 MG/1
TABLET ORAL
Qty: 90 TABLET | Refills: 3 | Status: SHIPPED
Start: 2021-05-24 | End: 2022-05-18

## 2021-05-24 NOTE — TELEPHONE ENCOUNTER
Last Appointment:  7/18/2019  Future Appointments   Date Time Provider Evonne Varma   6/7/2021  4:00 PM DO AMANDA [facility]

## 2021-06-07 ENCOUNTER — OFFICE VISIT (OUTPATIENT)
Dept: PRIMARY CARE CLINIC | Age: 51
End: 2021-06-07
Payer: COMMERCIAL

## 2021-06-07 VITALS
OXYGEN SATURATION: 96 % | SYSTOLIC BLOOD PRESSURE: 110 MMHG | HEART RATE: 79 BPM | DIASTOLIC BLOOD PRESSURE: 60 MMHG | HEIGHT: 75 IN | WEIGHT: 252 LBS | TEMPERATURE: 96.9 F | BODY MASS INDEX: 31.33 KG/M2

## 2021-06-07 DIAGNOSIS — K57.92 ACUTE DIVERTICULITIS: Primary | ICD-10-CM

## 2021-06-07 PROCEDURE — 99214 OFFICE O/P EST MOD 30 MIN: CPT | Performed by: FAMILY MEDICINE

## 2021-06-07 ASSESSMENT — PATIENT HEALTH QUESTIONNAIRE - PHQ9
2. FEELING DOWN, DEPRESSED OR HOPELESS: 0
1. LITTLE INTEREST OR PLEASURE IN DOING THINGS: 0
SUM OF ALL RESPONSES TO PHQ QUESTIONS 1-9: 0
SUM OF ALL RESPONSES TO PHQ9 QUESTIONS 1 & 2: 0

## 2021-06-07 NOTE — PATIENT INSTRUCTIONS
Patient Education        Learning About Diverticulosis and Diverticulitis  What are diverticulosis and diverticulitis? In diverticulosis and diverticulitis, pouches called diverticula form in the wall of the large intestine, or colon. · In diverticulosis, the pouches do not cause any pain or other symptoms. · In diverticulitis, the pouches get inflamed or infected and cause symptoms. Doctors aren't sure what causes these pouches in the colon. But they think that a low-fiber diet may play a role. Without fiber to add bulk to the stool, the colon has to work harder than normal to push the stool forward. The pressure from this may cause pouches to form in weak spots along the colon. Some people with diverticulosis get diverticulitis. But experts don't know why this happens. What are the symptoms? · In diverticulosis, most people don't have symptoms. But pouches sometimes bleed. · In diverticulitis, symptoms may last from a few hours to a week or more. They include:  ? Belly pain. This is usually in the lower left side. It is sometimes worse when you move. This is the most common symptom. ? Fever and chills. ? Bloating and gas. ? Diarrhea or constipation. ? Nausea and sometimes vomiting.  ? Not feeling like eating. How can you prevent diverticulitis? You may be able to lower your chance of getting diverticulitis. You can do this by taking steps to prevent constipation. · Eat fruits, vegetables, beans, and whole grains every day. These foods are high in fiber. · Drink plenty of fluids. If you have kidney, heart, or liver disease and have to limit fluids, talk with your doctor before you increase the amount of fluids you drink. · Get at least 30 minutes of exercise on most days of the week. Walking is a good choice. You also may want to do other activities, such as running, swimming, cycling, or playing tennis or team sports.   · Take a fiber supplement, such as Citrucel or Metamucil, every day if needed. Read and follow all instructions on the label. · Schedule time each day for a bowel movement. Having a daily routine may help. Take your time and do not strain when having a bowel movement. Some people avoid nuts, seeds, berries, and popcorn. They believe that these foods might get trapped in the diverticula and cause pain. But there is no proof that these foods cause diverticulitis or make it worse. How are these problems treated? · The best way to treat diverticulosis is to avoid constipation. · Treatment for diverticulitis includes antibiotics. It often includes a change in your diet. You may need only liquids at first. Your doctor may suggest pain medicines for pain or belly cramps. In some cases, surgery may be needed. Follow-up care is a key part of your treatment and safety. Be sure to make and go to all appointments, and call your doctor if you are having problems. It's also a good idea to know your test results and keep a list of the medicines you take. Where can you learn more? Go to [email or web address] org and sign in to your Waze account. Enter A036 in the [facility] box to learn more about \"Learning About Diverticulosis and Diverticulitis. \"     If you do not have an account, please click on the \"Sign Up Now\" link. Current as of: April 15, 2020               Content Version: 12.8  © 4561-7164 Healthwise, Incorporated. Care instructions adapted under license by [facility] [place] ([facility]). If you have questions about a medical condition or this instruction, always ask your healthcare professional. [place] [address] any warranty or liability for your use of this information.

## 2021-06-07 NOTE — PROGRESS NOTES
21  Shayy Almaraz : 1970 Sex: male  Age: 48 y.o. Chief Complaint   Patient presents with    Diverticulitis     seen in ED - still having discomfort in lower abdomen      HPI:  48 y.o. male presents today for ER follow up. Patient's chart, medical, surgical and medication history all reviewed. ER follow up  Date seen in the ER: 21  Symptoms: diffuse abdominal pain  Labs/Imaging: CBC, CMP, Lipase, Lactic acid and UA all normal.  CT A/P demonstrated mild diverticulosis and 11 mm renal stone  Diagnosis: Diverticulosis   Treatments: Cefdinir and Flagyl    Patient notes that he changed his diet and has lost 10 lbs. Symptoms have improved some, but still noting mild pain in lower abdomen. He has a strong family history of diverticulosis. Has not yet had screening colonoscopy. ROS:  Review of Systems   Constitutional: Negative for chills, fatigue and fever. Respiratory: Negative for cough, shortness of breath and wheezing. Cardiovascular: Negative for chest pain and palpitations. Gastrointestinal: Positive for abdominal pain. Negative for constipation, diarrhea, nausea and vomiting. Musculoskeletal: Negative for arthralgias and back pain. Skin: Negative for rash. Neurological: Negative for dizziness and headaches. Psychiatric/Behavioral: Negative for dysphoric mood. The patient is not nervous/anxious. All other systems reviewed and are negative. Current Outpatient Medications on File Prior to Visit   Medication Sig Dispense Refill    montelukast (SINGULAIR) 10 MG tablet TAKE 1 TABLET NIGHTLY 90 tablet 3    losartan (COZAAR) 50 MG tablet TAKE 1 TABLET DAILY 90 tablet 3    simvastatin (ZOCOR) 40 MG tablet Take 1 tablet by mouth nightly 30 tablet 0    Multiple Vitamins-Minerals (THERAPEUTIC MULTIVITAMIN-MINERALS) tablet Take 1 tablet by mouth daily. No current facility-administered medications on file prior to visit.        No Known Allergies    Past Medical History:   Diagnosis Date    Cervical (neck) region somatic dysfunction 2010    3 HERNIATED DISKS & HAS HAD CORTIZONE INJECTIONS    Hyperlipidemia     Hypertension     Sinusitis      Past Surgical History:   Procedure Laterality Date    CARDIAC CATHETERIZATION  2004    LITHOTRIPSY  2005    NASAL SINUS SURGERY      OTHER SURGICAL HISTORY  9/18/2014    FESS [facility] turbinator    SALIVARY GLAND SURGERY  2001    VASECTOMY  JAN 2014     History reviewed. No pertinent family history. Social History     Socioeconomic History    Marital status:      Spouse name: Not on file    Number of children: Not on file    Years of education: Not on file    Highest education level: Not on file   Occupational History    Not on file   Tobacco Use    Smoking status: Never Smoker    Smokeless tobacco: Never Used   Substance and Sexual Activity    Alcohol use: Yes     Comment: occasional    Drug use: No    Sexual activity: Not on file   Other Topics Concern    Not on file   Social History Narrative    Not on file     Social Determinants of Health     Financial Resource Strain:     Difficulty of Paying Living Expenses:    Food Insecurity:     Worried About Running Out of Food in the Last Year:     [address] in the Last Year:    Transportation Needs:     Lack of Transportation (Medical):      Lack of Transportation (Non-Medical):    Physical Activity:     Days of Exercise per Week:     Minutes of Exercise per Session:    Stress:     Feeling of Stress :    Social Connections:     Frequency of Communication with Friends and Family:     Frequency of Social Gatherings with Friends and Family:     Attends Mandaeism Services:     Active Member of Clubs or Organizations:     Attends Club or Organization Meetings:     Marital Status:    Intimate Partner Violence:     Fear of Current or Ex-Partner:     Emotionally Abused:     Physically Abused:     Sexually Abused:        Vitals:    06/07/21 1626   BP: 110/60   Pulse: 79   Temp: 96.9 °F (36.1 °C)   SpO2: 96%   Weight: 252 lb (114.3 kg)   Height: 6' 3\" (1.905 m)       Physical Exam:  Physical Exam  Vitals and nursing note reviewed. Constitutional:       General: He is not in acute distress. Appearance: Normal appearance. He is well-developed. He is obese. He is not ill-appearing. HENT:      Head: Normocephalic and atraumatic. Right Ear: Hearing and external ear normal.      Left Ear: Hearing and external ear normal.      Nose:      Comments: Patient wearing mask  Eyes:      General: Lids are normal. No scleral icterus. Extraocular Movements: Extraocular movements intact. Conjunctiva/sclera: Conjunctivae normal.   Neck:      Thyroid: No thyromegaly. Cardiovascular:      Rate and Rhythm: Normal rate and regular rhythm. Heart sounds: Normal heart sounds. No murmur heard. Pulmonary:      Effort: Pulmonary effort is normal. No respiratory distress. Breath sounds: Normal breath sounds. No wheezing. Abdominal:      General: Abdomen is flat. Bowel sounds are normal. There is no distension. Palpations: Abdomen is soft. There is no mass. Tenderness: There is no abdominal tenderness. There is no guarding or rebound. Musculoskeletal:         General: No tenderness or deformity. Normal range of motion. Cervical back: Normal range of motion and neck supple. Right lower leg: No edema. Left lower leg: No edema. Lymphadenopathy:      Cervical: No cervical adenopathy. Skin:     General: Skin is warm and dry. Findings: No rash. Neurological:      General: No focal deficit present. Mental Status: He is alert and oriented to person, place, and time. Gait: Gait normal.   Psychiatric:         Mood and Affect: Mood and affect normal.         Speech: Speech normal.         Behavior: Behavior normal.         Thought Content:  Thought content normal.         Labs:  CBC with Differential:    Lab Results   Component Value Date    WBC 10.8 05/21/2021    RBC 4.90 05/21/2021    HGB 15.6 05/21/2021    HCT 44.4 05/21/2021     05/21/2021    MCV 90.6 05/21/2021    MCH 31.8 05/21/2021    MCHC 35.1 05/21/2021    RDW 12.9 05/21/2021    LYMPHOPCT 14.2 05/21/2021    MONOPCT 7.1 05/21/2021    EOSPCT 2.8 05/15/2021    BASOPCT 0.4 05/21/2021    MONOSABS 0.77 05/21/2021    LYMPHSABS 1.53 05/21/2021    EOSABS 0.06 05/21/2021    BASOSABS 0.04 05/21/2021     CMP:    Lab Results   Component Value Date     05/21/2021    K 4.2 05/21/2021     05/21/2021    CO2 26 05/21/2021    BUN 12 05/21/2021    CREATININE 1.0 05/21/2021    GFRAA >60 05/21/2021    LABGLOM >60 05/21/2021    GLUCOSE 119 05/21/2021    PROT 7.7 05/21/2021    LABALBU 4.5 05/21/2021    CALCIUM 10.1 05/21/2021    BILITOT 0.6 05/21/2021    ALKPHOS 71 05/21/2021    AST 28 05/21/2021    ALT 28 05/21/2021     U/A:    Lab Results   Component Value Date    COLORU Yellow 05/21/2021    PROTEINU Negative 05/21/2021    PHUR 6.0 05/21/2021    WBCUA NONE 05/21/2021    RBCUA NONE 05/21/2021    BACTERIA NONE SEEN 05/21/2021    CLARITYU Clear 05/21/2021    SPECGRAV 1.020 05/21/2021    LEUKOCYTESUR Negative 05/21/2021    UROBILINOGEN 0.2 05/21/2021    BILIRUBINUR Negative 05/21/2021    BILIRUBINUR Negative 05/15/2021    BLOODU Negative 05/21/2021    GLUCOSEU Negative 05/21/2021     HgBA1c:    Lab Results   Component Value Date    LABA1C 5.3 05/15/2021     FLP:    Lab Results   Component Value Date    TRIG 106 05/15/2021    HDL 42 05/15/2021    LDLCALC 79 05/15/2021     TSH:    Lab Results   Component Value Date    TSH 2.47 05/15/2021     LIPASE:    Lab Results   Component Value Date    LIPASE 32 05/21/2021     PSA: No results found for: PSA       Assessment and Plan:  Jr Wan was seen today for diverticulitis. Diagnoses and all orders for this visit:    Acute diverticulitis  -     Amb External Referral To General Surgery    Discussed diagnosis and diet at length. Patient to increase fiber in diet. Will send to Dr. Krystal Espinoza for colonoscopy per patient request.        Return in about 6 months (around 12/7/2021), or if symptoms worsen or fail to improve, for Well visit.       Seen By:  Shayan Soliman, DO

## 2021-06-08 ASSESSMENT — ENCOUNTER SYMPTOMS
NAUSEA: 0
ABDOMINAL PAIN: 1
WHEEZING: 0
CONSTIPATION: 0
SHORTNESS OF BREATH: 0
COUGH: 0
VOMITING: 0
DIARRHEA: 0
BACK PAIN: 0

## 2021-12-30 ENCOUNTER — TELEPHONE (OUTPATIENT)
Dept: PRIMARY CARE CLINIC | Age: 51
End: 2021-12-30

## 2021-12-30 NOTE — TELEPHONE ENCOUNTER
----- Message from Manjula Summers sent at 12/30/2021 11:34 AM EST -----  Subject: Appointment Request    Reason for Call: Routine Existing Condition Follow Up    QUESTIONS  Type of Appointment? Established Patient  Reason for appointment request? No appointments available during search  Additional Information for Provider? pt is requesting an appt prefers   early morning or late afternoon appt. . pt screened green  ---------------------------------------------------------------------------  --------------  CALL BACK INFO  What is the best way for the office to contact you? OK to leave message on   voicemail, OK to respond with electronic message via ReSnap portal (only   for patients who have registered ReSnap account)  Preferred Call Back Phone Number? [phone]  ---------------------------------------------------------------------------  --------------  SCRIPT ANSWERS  Relationship to Patient? Self  Is this follow up request related to routine Diabetes Management? No  Have you been diagnosed with, awaiting test results for, or told that you   are suspected of having COVID-19 (Coronavirus)? (If patient has tested   negative or was tested as a requirement for work, school, or travel and   not based on symptoms, answer no)? No  Within the past two weeks have you developed any of the following symptoms   (answer no if symptoms have been present longer than 2 weeks or began   more than 2 weeks ago)? Fever or Chills, Cough, Shortness of breath or   difficulty breathing, Loss of taste or smell, Sore throat, Nasal   congestion, Sneezing or runny nose, Fatigue or generalized body aches   (answer no if pain is specific to a body part e.g. back pain), Diarrhea,   Headache? No  Have you had close contact with someone with COVID-19 in the last 14 days? No  (Service Expert  click yes below to proceed with [employer] As Usual   Scheduling)?  Yes

## 2022-01-14 ENCOUNTER — PATIENT MESSAGE (OUTPATIENT)
Dept: PRIMARY CARE CLINIC | Age: 52
End: 2022-01-14

## 2022-01-14 RX ORDER — PANTOPRAZOLE SODIUM 40 MG/1
40 TABLET, DELAYED RELEASE ORAL
Qty: 90 TABLET | Refills: 1 | Status: SHIPPED
Start: 2022-01-14 | End: 2022-08-23

## 2022-01-14 NOTE — TELEPHONE ENCOUNTER
From: Sven Comment  To: Dr. Aggie Ennis  Sent: 1/14/2022 9:58 AM EST  Subject: Refill needed    Elva,   Good morning. Can you please send in a refill request to [facility] for the medicine I take for the acid reflux . Starts with jesica CARREON . Dont have it with me. I have been off a couple of days and those annoying chest pains started again after I eat .        Thanks for all you do ,   [facility]

## 2022-02-14 DIAGNOSIS — E78.2 MIXED HYPERLIPIDEMIA: ICD-10-CM

## 2022-02-14 RX ORDER — SIMVASTATIN 40 MG
TABLET ORAL
Qty: 90 TABLET | Refills: 3 | Status: SHIPPED | OUTPATIENT
Start: 2022-02-14

## 2022-03-09 ASSESSMENT — ENCOUNTER SYMPTOMS
DIARRHEA: 0
BACK PAIN: 0
SHORTNESS OF BREATH: 0
WHEEZING: 0
COUGH: 0
CONSTIPATION: 0
NAUSEA: 0
VOMITING: 0

## 2022-03-10 ENCOUNTER — OFFICE VISIT (OUTPATIENT)
Dept: PRIMARY CARE CLINIC | Age: 52
End: 2022-03-10
Payer: COMMERCIAL

## 2022-03-10 VITALS
HEIGHT: 75 IN | WEIGHT: 259 LBS | HEART RATE: 86 BPM | OXYGEN SATURATION: 98 % | DIASTOLIC BLOOD PRESSURE: 82 MMHG | BODY MASS INDEX: 32.2 KG/M2 | SYSTOLIC BLOOD PRESSURE: 138 MMHG | TEMPERATURE: 97.2 F

## 2022-03-10 DIAGNOSIS — R73.01 IMPAIRED FASTING GLUCOSE: ICD-10-CM

## 2022-03-10 DIAGNOSIS — R10.32 COLICKY LLQ ABDOMINAL PAIN: ICD-10-CM

## 2022-03-10 DIAGNOSIS — E55.9 VITAMIN D DEFICIENCY: ICD-10-CM

## 2022-03-10 DIAGNOSIS — Z12.5 SCREENING FOR PROSTATE CANCER: ICD-10-CM

## 2022-03-10 DIAGNOSIS — E78.2 MIXED HYPERLIPIDEMIA: ICD-10-CM

## 2022-03-10 DIAGNOSIS — I10 BENIGN ESSENTIAL HYPERTENSION: Primary | ICD-10-CM

## 2022-03-10 DIAGNOSIS — K57.31 DIVERTICULOSIS LARGE INTESTINE W/O PERFORATION OR ABSCESS W/BLEEDING: ICD-10-CM

## 2022-03-10 PROBLEM — N64.59 ABNORMAL BREAST TISSUE: Status: RESOLVED | Noted: 2019-08-17 | Resolved: 2022-03-10

## 2022-03-10 PROCEDURE — 99214 OFFICE O/P EST MOD 30 MIN: CPT | Performed by: FAMILY MEDICINE

## 2022-03-10 RX ORDER — LOSARTAN POTASSIUM 50 MG/1
50 TABLET ORAL DAILY
Qty: 90 TABLET | Refills: 3 | Status: SHIPPED | OUTPATIENT
Start: 2022-03-10

## 2022-03-10 ASSESSMENT — ENCOUNTER SYMPTOMS: ABDOMINAL PAIN: 1

## 2022-03-10 NOTE — PROGRESS NOTES
3/10/22  Sven Comment : 1970 Sex: male  Age: 46 y.o. Chief Complaint   Patient presents with    Diverticulitis    Hypertension    Immunizations     wants to discuss when he should get the shingles vaccine     HPI:  46 y.o. male patient presents today for 6 month(s) follow up of chronic medical conditions, medication refills and FBW. Patient's chart, medical, surgical and medication history all reviewed. Hypertension   The patient presents today for follow up of HTN. The problem is well controlled. Risk factors for coronary artery disease include Age > 27, male, HTN and elevated cholesterol. Current treatments include losartan (Cozaar). The patient is compliant all of the time. Lifestyle changes the patient has made include none. Today the patient is complaining of none. Hyperlipidemia  The 10-year ASCVD risk score (Rosita Hirsch, et al., 2013) is: 3.8%    Values used to calculate the score:      Age: 46 years      Sex: Male      Is Non- : No      Diabetic: No      Tobacco smoker: No      Systolic Blood Pressure: 150 mmHg      Is BP treated: Yes      HDL Cholesterol: 42 mg/dL      Total Cholesterol: 140 mg/dL    Patient had an episode of diverticulitis in May 2021. He has had ongoing dull LLQ since that time. Mild improvement with bowel movement, but then it recurs. ROS:  Review of Systems   Constitutional: Negative for chills, fatigue and fever. Respiratory: Negative for cough, shortness of breath and wheezing. Cardiovascular: Negative for chest pain and palpitations. Gastrointestinal: Positive for abdominal pain. Negative for constipation, diarrhea, nausea and vomiting. Musculoskeletal: Negative for arthralgias and back pain. Skin: Negative for rash. Neurological: Negative for dizziness and headaches. Psychiatric/Behavioral: Negative for dysphoric mood. The patient is not nervous/anxious.     All other systems reviewed and are negative. Current Outpatient Medications on File Prior to Visit   Medication Sig Dispense Refill    BROMPHENIRAMINE-PSEUDOEPH PO Take by mouth      simvastatin (ZOCOR) 40 MG tablet TAKE 1 TABLET NIGHTLY 90 tablet 3    pantoprazole (PROTONIX) 40 MG tablet Take 1 tablet by mouth every morning (before breakfast) 90 tablet 1    montelukast (SINGULAIR) 10 MG tablet TAKE 1 TABLET NIGHTLY 90 tablet 3    losartan (COZAAR) 50 MG tablet TAKE 1 TABLET DAILY 90 tablet 3    Multiple Vitamins-Minerals (THERAPEUTIC MULTIVITAMIN-MINERALS) tablet Take 1 tablet by mouth daily. No current facility-administered medications on file prior to visit. No Known Allergies    Past Medical History:   Diagnosis Date    Cervical (neck) region somatic dysfunction 2010    3 HERNIATED DISKS & HAS HAD CORTIZONE INJECTIONS    Hyperlipidemia     Hypertension     Sinusitis      Past Surgical History:   Procedure Laterality Date    CARDIAC CATHETERIZATION  2004    LITHOTRIPSY  2005    NASAL SINUS SURGERY      OTHER SURGICAL HISTORY  9/18/2014    FESS [facility] turbinator    SALIVARY GLAND SURGERY  2001    VASECTOMY  JAN 2014     History reviewed. No pertinent family history.   Social History     Socioeconomic History    Marital status:      Spouse name: Not on file    Number of children: Not on file    Years of education: Not on file    Highest education level: Not on file   Occupational History    Not on file   Tobacco Use    Smoking status: Never Smoker    Smokeless tobacco: Never Used   Substance and Sexual Activity    Alcohol use: Yes     Comment: occasional    Drug use: No    Sexual activity: Not on file   Other Topics Concern    Not on file   Social History Narrative    Not on file     Social Determinants of Health     Financial Resource Strain:     Difficulty of Paying Living Expenses: Not on file   Food Insecurity:     Worried About Running Out of Food in the Last Year: Not on file    Chidi ramos Food in the Last Year: Not on file   Transportation Needs:     Lack of Transportation (Medical): Not on file    Lack of Transportation (Non-Medical): Not on file   Physical Activity:     Days of Exercise per Week: Not on file    Minutes of Exercise per Session: Not on file   Stress:     Feeling of Stress : Not on file   Social Connections:     Frequency of Communication with Friends and Family: Not on file    Frequency of Social Gatherings with Friends and Family: Not on file    Attends Zoroastrian Services: Not on file    Active Member of [address] [place] [address] [employer] or Organizations: Not on file    Attends Club or Organization Meetings: Not on file    Marital Status: Not on file   Intimate Partner Violence:     Fear of Current or Ex-Partner: Not on file    Emotionally Abused: Not on file    Physically Abused: Not on file    Sexually Abused: Not on file   Housing Stability:     Unable to Pay for Housing in the Last Year: Not on file    Number of [place] in the Last Year: Not on file    Unstable Housing in the Last Year: Not on file       Vitals:    03/10/22 0829   BP: 138/82   Pulse: 86   Temp: 97.2 °F (36.2 °C)   SpO2: 98%   Weight: 259 lb (117.5 kg)   Height: 6' 3\" (1.905 m)       Physical Exam:  Physical Exam  Vitals and nursing note reviewed. Constitutional:       General: He is not in acute distress. Appearance: Normal appearance. He is well-developed. He is obese. He is not ill-appearing. HENT:      Head: Normocephalic and atraumatic. Right Ear: Hearing and external ear normal.      Left Ear: Hearing and external ear normal.      Nose:      Comments: Patient wearing mask  Eyes:      General: Lids are normal. No scleral icterus. Extraocular Movements: Extraocular movements intact. Conjunctiva/sclera: Conjunctivae normal.   Neck:      Thyroid: No thyromegaly. Cardiovascular:      Rate and Rhythm: Normal rate and regular rhythm. Heart sounds: Normal heart sounds.  No murmur heard.      Pulmonary:      Effort: Pulmonary effort is normal. No respiratory distress. Breath sounds: Normal breath sounds. No wheezing. Musculoskeletal:         General: No tenderness or deformity. Normal range of motion. Cervical back: Normal range of motion and neck supple. Right lower leg: No edema. Left lower leg: No edema. Lymphadenopathy:      Cervical: No cervical adenopathy. Skin:     General: Skin is warm and dry. Findings: No rash. Neurological:      General: No focal deficit present. Mental Status: He is alert and oriented to person, place, and time. Gait: Gait normal.   Psychiatric:         Mood and Affect: Mood and affect normal.         Speech: Speech normal.         Behavior: Behavior normal.         Thought Content:  Thought content normal.         Labs:  CBC with Differential:    Lab Results   Component Value Date    WBC 10.8 05/21/2021    RBC 4.90 05/21/2021    HGB 15.6 05/21/2021    HCT 44.4 05/21/2021     05/21/2021    MCV 90.6 05/21/2021    MCH 31.8 05/21/2021    MCHC 35.1 05/21/2021    RDW 12.9 05/21/2021    LYMPHOPCT 14.2 05/21/2021    MONOPCT 7.1 05/21/2021    EOSPCT 2.8 05/15/2021    BASOPCT 0.4 05/21/2021    MONOSABS 0.77 05/21/2021    LYMPHSABS 1.53 05/21/2021    EOSABS 0.06 05/21/2021    BASOSABS 0.04 05/21/2021     CMP:    Lab Results   Component Value Date     05/21/2021    K 4.2 05/21/2021     05/21/2021    CO2 26 05/21/2021    BUN 12 05/21/2021    CREATININE 1.0 05/21/2021    GFRAA >60 05/21/2021    LABGLOM >60 05/21/2021    GLUCOSE 119 05/21/2021    PROT 7.7 05/21/2021    LABALBU 4.5 05/21/2021    CALCIUM 10.1 05/21/2021    BILITOT 0.6 05/21/2021    ALKPHOS 71 05/21/2021    AST 28 05/21/2021    ALT 28 05/21/2021     Uric Acid:    Lab Results   Component Value Date    URICACID 6.6 08/29/2020     HgBA1c:    Lab Results   Component Value Date    LABA1C 5.3 05/15/2021     Microalbumen/Creatinine ratio:  No components found for: RUCREAT  FLP:    Lab Results   Component Value Date    TRIG 106 05/15/2021    HDL 42 05/15/2021    LDLCALC 79 05/15/2021     TSH:    Lab Results   Component Value Date    TSH 2.47 05/15/2021          Assessment and Plan:  Kale Sanabria was seen today for diverticulitis, hypertension and immunizations. Diagnoses and all orders for this visit:    Benign essential hypertension  -     losartan (COZAAR) 50 MG tablet; Take 1 tablet by mouth daily  -     CBC with Auto Differential; Future  -     Comprehensive Metabolic Panel; Future  -     Lipid Panel; Future  -     TSH; Future  -     Urinalysis; Future  Well controlled on current regimen. Due for refills and labs. Mixed hyperlipidemia  -     Lipid Panel; Future    Colicky LLQ abdominal pain  -     CT ABDOMEN PELVIS W IV CONTRAST Additional Contrast? Oral; Future  Ongoing pain. Question if possible psoas strain as well. Will check CT A/P to rule out other intraabdominal pathology. Diverticulosis large intestine w/o perforation or abscess w/bleeding  -     CT ABDOMEN PELVIS W IV CONTRAST Additional Contrast? Oral; Future    Impaired fasting glucose  -     Hemoglobin A1C; Future    Screening for prostate cancer  -     PSA Screening; Future    Vitamin D deficiency  -     Vitamin D 25 Hydroxy; Future          Return in about 6 months (around 9/10/2022), or if symptoms worsen or fail to improve, for Well visit.       Seen By:  Jason Renner DO

## 2022-03-28 ENCOUNTER — PATIENT MESSAGE (OUTPATIENT)
Dept: PRIMARY CARE CLINIC | Age: 52
End: 2022-03-28

## 2022-03-28 NOTE — TELEPHONE ENCOUNTER
From: Valentino Capuchin  To: Dr. Denia Freeman  Sent: 3/28/2022 9:30 AM EDT  Subject: CT Scan complete    Elva,   Good morning. I just got my CT scan completed this morning. I will go and get my blood work this week . Do I need to schedule a follow up appointment with you to go over my test results? My lower abdominal area is still hurting. Even have some discomfort going down my left leg.      Thanks , and have a great day     Gopal Reed

## 2022-03-30 DIAGNOSIS — K57.31 DIVERTICULOSIS LARGE INTESTINE W/O PERFORATION OR ABSCESS W/BLEEDING: ICD-10-CM

## 2022-03-30 DIAGNOSIS — R10.32 COLICKY LLQ ABDOMINAL PAIN: ICD-10-CM

## 2022-05-18 RX ORDER — MONTELUKAST SODIUM 10 MG/1
TABLET ORAL
Qty: 90 TABLET | Refills: 3 | Status: SHIPPED | OUTPATIENT
Start: 2022-05-18

## 2022-08-17 ENCOUNTER — TELEPHONE (OUTPATIENT)
Dept: FAMILY MEDICINE CLINIC | Age: 52
End: 2022-08-17

## 2022-08-17 NOTE — TELEPHONE ENCOUNTER
----- Message from [place], [place] sent at 8/17/2022 11:41 AM EDT -----  Subject: Appointment Request    Reason for Call: Established Patient Appointment needed: Routine Existing   Condition Follow Up    QUESTIONS    Reason for appointment request? Available appointments did not meet   patient need     Additional Information for Provider? Pt called and stated that he would   like to make a F/U appointment for chronic stomach pains. No   availabilities on our end until 10/25/2022. Pt would like to have a sooner   appointment than that.  Please call the pt back to schedule.   ---------------------------------------------------------------------------  --------------  Kayla HUNTER  [phone]; OK to leave message on voicemail  ---------------------------------------------------------------------------  --------------  SCRIPT ANSWERS  COVID Screen: Erin Bhandari

## 2022-08-23 ENCOUNTER — OFFICE VISIT (OUTPATIENT)
Dept: PRIMARY CARE CLINIC | Age: 52
End: 2022-08-23
Payer: COMMERCIAL

## 2022-08-23 VITALS
RESPIRATION RATE: 18 BRPM | HEART RATE: 80 BPM | HEIGHT: 75 IN | OXYGEN SATURATION: 97 % | BODY MASS INDEX: 32.7 KG/M2 | DIASTOLIC BLOOD PRESSURE: 82 MMHG | SYSTOLIC BLOOD PRESSURE: 122 MMHG | WEIGHT: 263 LBS | TEMPERATURE: 96.8 F

## 2022-08-23 DIAGNOSIS — I10 BENIGN ESSENTIAL HYPERTENSION: Primary | ICD-10-CM

## 2022-08-23 DIAGNOSIS — E55.9 VITAMIN D DEFICIENCY: ICD-10-CM

## 2022-08-23 DIAGNOSIS — R10.32 COLICKY LLQ ABDOMINAL PAIN: ICD-10-CM

## 2022-08-23 DIAGNOSIS — K57.31 DIVERTICULOSIS LARGE INTESTINE W/O PERFORATION OR ABSCESS W/BLEEDING: ICD-10-CM

## 2022-08-23 DIAGNOSIS — R10.32 LEFT GROIN PAIN: ICD-10-CM

## 2022-08-23 DIAGNOSIS — E78.2 MIXED HYPERLIPIDEMIA: ICD-10-CM

## 2022-08-23 DIAGNOSIS — Z12.5 SCREENING FOR PROSTATE CANCER: ICD-10-CM

## 2022-08-23 DIAGNOSIS — R73.01 IMPAIRED FASTING GLUCOSE: ICD-10-CM

## 2022-08-23 PROCEDURE — 99214 OFFICE O/P EST MOD 30 MIN: CPT | Performed by: FAMILY MEDICINE

## 2022-08-23 RX ORDER — ESOMEPRAZOLE MAGNESIUM 40 MG/1
40 CAPSULE, DELAYED RELEASE ORAL
Qty: 30 CAPSULE | Refills: 5 | Status: SHIPPED | OUTPATIENT
Start: 2022-08-23

## 2022-08-23 SDOH — ECONOMIC STABILITY: FOOD INSECURITY: WITHIN THE PAST 12 MONTHS, THE FOOD YOU BOUGHT JUST DIDN'T LAST AND YOU DIDN'T HAVE MONEY TO GET MORE.: NEVER TRUE

## 2022-08-23 SDOH — ECONOMIC STABILITY: FOOD INSECURITY: WITHIN THE PAST 12 MONTHS, YOU WORRIED THAT YOUR FOOD WOULD RUN OUT BEFORE YOU GOT MONEY TO BUY MORE.: NEVER TRUE

## 2022-08-23 ASSESSMENT — PATIENT HEALTH QUESTIONNAIRE - PHQ9
SUM OF ALL RESPONSES TO PHQ QUESTIONS 1-9: 0
SUM OF ALL RESPONSES TO PHQ9 QUESTIONS 1 & 2: 0
SUM OF ALL RESPONSES TO PHQ QUESTIONS 1-9: 0
2. FEELING DOWN, DEPRESSED OR HOPELESS: 0
SUM OF ALL RESPONSES TO PHQ QUESTIONS 1-9: 0
1. LITTLE INTEREST OR PLEASURE IN DOING THINGS: 0
SUM OF ALL RESPONSES TO PHQ QUESTIONS 1-9: 0

## 2022-08-23 ASSESSMENT — ENCOUNTER SYMPTOMS
SHORTNESS OF BREATH: 0
ABDOMINAL PAIN: 1
DIARRHEA: 0
VOMITING: 0
WHEEZING: 0
COUGH: 0
CONSTIPATION: 0
NAUSEA: 0
BACK PAIN: 0

## 2022-08-23 ASSESSMENT — SOCIAL DETERMINANTS OF HEALTH (SDOH): HOW HARD IS IT FOR YOU TO PAY FOR THE VERY BASICS LIKE FOOD, HOUSING, MEDICAL CARE, AND HEATING?: NOT HARD AT ALL

## 2022-08-23 NOTE — PROGRESS NOTES
22  Emiliana Dennis : 1970 Sex: male  Age: 46 y.o. Chief Complaint   Patient presents with    Abdominal Pain     Pt c/o chronic abdominal pain and cramping in upper and lower left quadrants for 18 months. Pain is a constant 3-4. He also states he always feels bloated in lower abdomen. HPI:  46 y.o. male patient presents today for 6 month(s) follow up of chronic medical conditions, medication refills and FBW. Patient's chart, medical, surgical and medication history all reviewed. Hypertension   The patient presents today for follow up of HTN. The problem is well controlled. Risk factors for coronary artery disease include Age > 27, male, HTN and elevated cholesterol. Current treatments include losartan (Cozaar). The patient is compliant all of the time. Lifestyle changes the patient has made include  none . Today the patient is complaining of none. Hyperlipidemia  The 10-year ASCVD risk score (Larissa Lang., et al., 2013) is: 3.4%    Values used to calculate the score:      Age: 46 years      Sex: Male      Is Non- : No      Diabetic: No      Tobacco smoker: No      Systolic Blood Pressure: 971 mmHg      Is BP treated: Yes      HDL Cholesterol: 42 mg/dL      Total Cholesterol: 140 mg/dL    Patient had an episode of diverticulitis in May 2021. He has had ongoing dull LLQ since that time. Mild improvement with bowel movement, but then it recurs. Has had repeat CT scan in 2022 with evidence of only inguinal hernias. Hernia repair in 2022. Still having persistent L sided abdominal symptoms. Feels like an ache. Trial of Bentyl with no relief. ROS:  Review of Systems   Constitutional:  Negative for chills, fatigue and fever. Respiratory:  Negative for cough, shortness of breath and wheezing. Cardiovascular:  Negative for chest pain and palpitations. Gastrointestinal:  Positive for abdominal pain.  Negative for constipation, diarrhea, nausea and vomiting. Musculoskeletal:  Positive for arthralgias (L groin). Negative for back pain. Skin:  Negative for rash. Neurological:  Negative for dizziness and headaches. Psychiatric/Behavioral:  Negative for dysphoric mood. The patient is not nervous/anxious. All other systems reviewed and are negative. Current Outpatient Medications on File Prior to Visit   Medication Sig Dispense Refill    montelukast (SINGULAIR) 10 MG tablet TAKE 1 TABLET NIGHTLY 90 tablet 3    losartan (COZAAR) 50 MG tablet Take 1 tablet by mouth daily 90 tablet 3    simvastatin (ZOCOR) 40 MG tablet TAKE 1 TABLET NIGHTLY 90 tablet 3    Multiple Vitamins-Minerals (THERAPEUTIC MULTIVITAMIN-MINERALS) tablet Take 1 tablet by mouth daily. No current facility-administered medications on file prior to visit. No Known Allergies    Past Medical History:   Diagnosis Date    Cervical (neck) region somatic dysfunction 2010    3 HERNIATED DISKS & HAS HAD CORTIZONE INJECTIONS    Hyperlipidemia     Hypertension     Sinusitis      Past Surgical History:   Procedure Laterality Date    CARDIAC CATHETERIZATION  2004    LITHOTRIPSY  2005    NASAL SINUS SURGERY      OTHER SURGICAL HISTORY  9/18/2014    FESS [facility] turbinator    SALIVARY GLAND SURGERY  2001    VASECTOMY  JAN 2014     History reviewed. No pertinent family history.   Social History     Socioeconomic History    Marital status:      Spouse name: Not on file    Number of children: Not on file    Years of education: Not on file    Highest education level: Not on file   Occupational History    Not on file   Tobacco Use    Smoking status: Never    Smokeless tobacco: Never   Substance and Sexual Activity    Alcohol use: Yes     Comment: occasional    Drug use: No    Sexual activity: Not on file   Other Topics Concern    Not on file   Social History Narrative    Not on file     Social Determinants of Health     Financial Resource Strain: Low Risk     Difficulty of Paying Living Expenses: Not hard at all   Food Insecurity: No Food Insecurity    Worried About Running Out of Food in the Last Year: Never true    Ran Out of Food in the Last Year: Never true   Transportation Needs: Not on file   Physical Activity: Not on file   Stress: Not on file   Social Connections: Not on file   Intimate Partner Violence: Not on file   Housing Stability: Not on file       Vitals:    08/23/22 1332   BP: 122/82   Pulse: 80   Resp: 18   Temp: 96.8 °F (36 °C)   TempSrc: Temporal   SpO2: 97%   Weight: 263 lb (119.3 kg)   Height: 6' 3\" (1.905 m)       Physical Exam:  Physical Exam  Vitals and nursing note reviewed. Constitutional:       General: He is not in acute distress. Appearance: Normal appearance. He is well-developed. He is obese. He is not ill-appearing. HENT:      Head: Normocephalic and atraumatic. Right Ear: Hearing and external ear normal.      Left Ear: Hearing and external ear normal.      Nose:      Comments: Patient wearing mask  Eyes:      General: Lids are normal. No scleral icterus. Extraocular Movements: Extraocular movements intact. Conjunctiva/sclera: Conjunctivae normal.   Neck:      Thyroid: No thyromegaly. Cardiovascular:      Rate and Rhythm: Normal rate and regular rhythm. Heart sounds: Normal heart sounds. No murmur heard. Pulmonary:      Effort: Pulmonary effort is normal. No respiratory distress. Breath sounds: Normal breath sounds. No wheezing. Abdominal:      General: Abdomen is flat. Bowel sounds are normal. There is no distension. Palpations: Abdomen is soft. There is no mass. Tenderness: There is abdominal tenderness (L sided abdomen). There is no guarding or rebound. Musculoskeletal:         General: No tenderness or deformity. Normal range of motion. Cervical back: Normal range of motion and neck supple. Right lower leg: No edema. Left lower leg: No edema.    Lymphadenopathy:      Cervical: No cervical adenopathy. Skin:     General: Skin is warm and dry. Findings: No rash. Neurological:      General: No focal deficit present. Mental Status: He is alert and oriented to person, place, and time. Gait: Gait normal.   Psychiatric:         Mood and Affect: Mood and affect normal.         Speech: Speech normal.         Behavior: Behavior normal.         Thought Content:  Thought content normal.       Labs:  CBC with Differential:    Lab Results   Component Value Date/Time    WBC 10.8 05/21/2021 10:51 AM    RBC 4.90 05/21/2021 10:51 AM    HGB 15.6 05/21/2021 10:51 AM    HCT 44.4 05/21/2021 10:51 AM     05/21/2021 10:51 AM    MCV 90.6 05/21/2021 10:51 AM    MCH 31.8 05/21/2021 10:51 AM    MCHC 35.1 05/21/2021 10:51 AM    RDW 12.9 05/21/2021 10:51 AM    LYMPHOPCT 14.2 05/21/2021 10:51 AM    MONOPCT 7.1 05/21/2021 10:51 AM    EOSPCT 2.8 05/15/2021 12:00 AM    BASOPCT 0.4 05/21/2021 10:51 AM    MONOSABS 0.77 05/21/2021 10:51 AM    LYMPHSABS 1.53 05/21/2021 10:51 AM    EOSABS 0.06 05/21/2021 10:51 AM    BASOSABS 0.04 05/21/2021 10:51 AM     CMP:    Lab Results   Component Value Date/Time     05/21/2021 10:51 AM    K 4.2 05/21/2021 10:51 AM     05/21/2021 10:51 AM    CO2 26 05/21/2021 10:51 AM    BUN 12 05/21/2021 10:51 AM    CREATININE 1.0 05/21/2021 10:51 AM    GFRAA >60 05/21/2021 10:51 AM    LABGLOM >60 05/21/2021 10:51 AM    GLUCOSE 119 05/21/2021 10:51 AM    PROT 7.7 05/21/2021 10:51 AM    LABALBU 4.5 05/21/2021 10:51 AM    CALCIUM 10.1 05/21/2021 10:51 AM    BILITOT 0.6 05/21/2021 10:51 AM    ALKPHOS 71 05/21/2021 10:51 AM    AST 28 05/21/2021 10:51 AM    ALT 28 05/21/2021 10:51 AM     Uric Acid:    Lab Results   Component Value Date/Time    URICACID 6.6 08/29/2020 12:00 AM     HgBA1c:    Lab Results   Component Value Date/Time    LABA1C 5.3 05/15/2021 12:00 AM     Microalbumen/Creatinine ratio:  No components found for: RUCREAT  FLP:    Lab Results   Component Value Date/Time    TRIG 106 05/15/2021 12:00 AM    HDL 42 05/15/2021 12:00 AM    LDLCALC 79 05/15/2021 12:00 AM     TSH:    Lab Results   Component Value Date/Time    TSH 2.47 05/15/2021 12:00 AM          Assessment and Plan:  Janneth Lewis was seen today for abdominal pain. Diagnoses and all orders for this visit:    Benign essential hypertension  -     CBC with Auto Differential; Future  -     Comprehensive Metabolic Panel; Future  -     Lipid Panel; Future  -     TSH; Future  -     Urinalysis; Future  -     Basic Metabolic Panel; Future  -     Hepatic Function Panel; Future  Well controlled. Overdue for routine labs. Will have done this weekend. Diverticulosis large intestine w/o perforation or abscess w/bleeding  -     Amb External Referral To Gastroenterology  -     Hepatic Function Panel; Future    Colicky LLQ abdominal pain  -     Amb External Referral To Gastroenterology  -     Hepatic Function Panel; Future  -     esomeprazole (NEXIUM) 40 MG delayed release capsule; Take 1 capsule by mouth every morning (before breakfast)  Acutely tender in L mid abdomen. Only mild diverticulosis on any imaging. Normal colonoscopy in 2021. Need help from GI at this point. Mixed hyperlipidemia  Due for recheck     Left groin pain  -     XR HIP 2-3 VW W PELVIS LEFT; Future  Need to rule out any referred pain from hips in lower abdomen. Vitamin D deficiency  -     Vitamin D 25 Hydroxy; Future    Impaired fasting glucose  -     Hemoglobin A1C; Future    Screening for prostate cancer  -     PSA Screening; Future          Return after testing.       Seen By:  Liborio Bardales DO

## 2022-08-26 ENCOUNTER — HOSPITAL ENCOUNTER (OUTPATIENT)
Age: 52
Discharge: HOME | End: 2022-08-26
Payer: COMMERCIAL

## 2022-08-26 ENCOUNTER — HOSPITAL ENCOUNTER (OUTPATIENT)
Dept: CT IMAGING | Age: 52
Discharge: HOME | End: 2022-08-28
Payer: COMMERCIAL

## 2022-08-26 DIAGNOSIS — Z12.5 SCREENING FOR PROSTATE CANCER: ICD-10-CM

## 2022-08-26 DIAGNOSIS — K57.31 DIVERTICULOSIS LARGE INTESTINE W/O PERFORATION OR ABSCESS W/BLEEDING: ICD-10-CM

## 2022-08-26 DIAGNOSIS — R73.01 IMPAIRED FASTING GLUCOSE: ICD-10-CM

## 2022-08-26 DIAGNOSIS — I10 BENIGN ESSENTIAL HYPERTENSION: ICD-10-CM

## 2022-08-26 DIAGNOSIS — E55.9 VITAMIN D DEFICIENCY: ICD-10-CM

## 2022-08-26 DIAGNOSIS — R10.32 COLICKY LLQ ABDOMINAL PAIN: ICD-10-CM

## 2022-08-26 DIAGNOSIS — R10.12 LEFT UPPER QUADRANT ABDOMINAL PAIN: ICD-10-CM

## 2022-08-26 LAB
ALBUMIN SERPL-MCNC: 4.1 G/DL (ref 3.5–5.2)
ALP BLD-CCNC: 78 U/L (ref 40–129)
ALT SERPL-CCNC: 20 U/L (ref 0–40)
ANION GAP SERPL CALCULATED.3IONS-SCNC: 10 MMOL/L (ref 7–16)
AST SERPL-CCNC: 20 U/L (ref 0–39)
BASOPHILS ABSOLUTE: 0.03 E9/L (ref 0–0.2)
BASOPHILS RELATIVE PERCENT: 0.3 % (ref 0–2)
BILIRUB SERPL-MCNC: 0.6 MG/DL (ref 0–1.2)
BILIRUBIN DIRECT: <0.2 MG/DL (ref 0–0.3)
BILIRUBIN URINE: NEGATIVE
BILIRUBIN, INDIRECT: NORMAL MG/DL (ref 0–1)
BLOOD, URINE: NEGATIVE
BUN BLDV-MCNC: 11 MG/DL (ref 6–20)
CALCIUM SERPL-MCNC: 9.5 MG/DL (ref 8.6–10.2)
CHLORIDE BLD-SCNC: 102 MMOL/L (ref 98–107)
CHOLESTEROL, TOTAL: 132 MG/DL (ref 0–199)
CLARITY: CLEAR
CO2: 26 MMOL/L (ref 22–29)
COLOR: YELLOW
CREAT SERPL-MCNC: 1 MG/DL (ref 0.7–1.2)
EOSINOPHILS ABSOLUTE: 0.11 E9/L (ref 0.05–0.5)
EOSINOPHILS RELATIVE PERCENT: 1.2 % (ref 0–6)
GFR AFRICAN AMERICAN: >60
GFR NON-AFRICAN AMERICAN: >60 ML/MIN/1.73
GLUCOSE BLD-MCNC: 92 MG/DL (ref 74–99)
GLUCOSE URINE: NEGATIVE MG/DL
HBA1C MFR BLD: 5.5 % (ref 4–5.6)
HCT VFR BLD CALC: 44.1 % (ref 37–54)
HDLC SERPL-MCNC: 47 MG/DL
HEMOGLOBIN: 14.4 G/DL (ref 12.5–16.5)
IMMATURE GRANULOCYTES #: 0.02 E9/L
IMMATURE GRANULOCYTES %: 0.2 % (ref 0–5)
KETONES, URINE: NEGATIVE MG/DL
LDL CHOLESTEROL CALCULATED: 72 MG/DL (ref 0–99)
LEUKOCYTE ESTERASE, URINE: NEGATIVE
LYMPHOCYTES ABSOLUTE: 2.52 E9/L (ref 1.5–4)
LYMPHOCYTES RELATIVE PERCENT: 27.9 % (ref 20–42)
MCH RBC QN AUTO: 30.3 PG (ref 26–35)
MCHC RBC AUTO-ENTMCNC: 32.7 % (ref 32–34.5)
MCV RBC AUTO: 92.8 FL (ref 80–99.9)
MONOCYTES ABSOLUTE: 0.92 E9/L (ref 0.1–0.95)
MONOCYTES RELATIVE PERCENT: 10.2 % (ref 2–12)
NEUTROPHILS ABSOLUTE: 5.42 E9/L (ref 1.8–7.3)
NEUTROPHILS RELATIVE PERCENT: 60.2 % (ref 43–80)
NITRITE, URINE: NEGATIVE
PDW BLD-RTO: 13 FL (ref 11.5–15)
PH UA: 6 (ref 5–9)
PLATELET # BLD: 198 E9/L (ref 130–450)
PMV BLD AUTO: 9.3 FL (ref 7–12)
POTASSIUM SERPL-SCNC: 4.3 MMOL/L (ref 3.5–5)
PROSTATE SPECIFIC ANTIGEN: 0.39 NG/ML (ref 0–4)
PROTEIN UA: NEGATIVE MG/DL
RBC # BLD: 4.75 E12/L (ref 3.8–5.8)
SODIUM BLD-SCNC: 138 MMOL/L (ref 132–146)
SPECIFIC GRAVITY UA: 1.01 (ref 1–1.03)
TOTAL PROTEIN: 7.5 G/DL (ref 6.4–8.3)
TRIGL SERPL-MCNC: 67 MG/DL (ref 0–149)
TSH SERPL DL<=0.05 MIU/L-ACNC: 1.21 UIU/ML (ref 0.27–4.2)
UROBILINOGEN, URINE: 0.2 E.U./DL
VITAMIN D 25-HYDROXY: 41 NG/ML (ref 30–100)
VLDLC SERPL CALC-MCNC: 13 MG/DL
WBC # BLD: 9 E9/L (ref 4.5–11.5)

## 2022-08-26 PROCEDURE — 83036 HEMOGLOBIN GLYCOSYLATED A1C: CPT

## 2022-08-26 PROCEDURE — 36415 COLL VENOUS BLD VENIPUNCTURE: CPT

## 2022-08-26 PROCEDURE — 82306 VITAMIN D 25 HYDROXY: CPT

## 2022-08-26 PROCEDURE — 85025 COMPLETE CBC W/AUTO DIFF WBC: CPT

## 2022-08-26 PROCEDURE — 82248 BILIRUBIN DIRECT: CPT

## 2022-08-26 PROCEDURE — 74177 CT ABD & PELVIS W/CONTRAST: CPT

## 2022-08-26 PROCEDURE — G0103 PSA SCREENING: HCPCS

## 2022-08-26 PROCEDURE — 80053 COMPREHEN METABOLIC PANEL: CPT

## 2022-08-26 PROCEDURE — 81003 URINALYSIS AUTO W/O SCOPE: CPT

## 2022-08-26 PROCEDURE — 84443 ASSAY THYROID STIM HORMONE: CPT

## 2022-08-26 PROCEDURE — 6360000004 HC RX CONTRAST MEDICATION: Performed by: RADIOLOGY

## 2022-08-26 PROCEDURE — 80061 LIPID PANEL: CPT

## 2022-08-26 RX ORDER — CEFDINIR 300 MG/1
300 CAPSULE ORAL 2 TIMES DAILY
Qty: 14 CAPSULE | Refills: 0 | Status: SHIPPED | OUTPATIENT
Start: 2022-08-26 | End: 2022-09-02

## 2022-08-26 RX ORDER — METRONIDAZOLE 500 MG/1
500 TABLET ORAL 2 TIMES DAILY
Qty: 14 TABLET | Refills: 0 | Status: SHIPPED | OUTPATIENT
Start: 2022-08-26 | End: 2022-09-02

## 2022-08-26 RX ADMIN — IOPAMIDOL 75 ML: 755 INJECTION, SOLUTION INTRAVENOUS at 15:37

## 2022-08-26 RX ADMIN — IOHEXOL 50 ML: 240 INJECTION, SOLUTION INTRATHECAL; INTRAVASCULAR; INTRAVENOUS; ORAL at 15:37

## 2023-01-26 ENCOUNTER — TELEPHONE (OUTPATIENT)
Dept: FAMILY MEDICINE CLINIC | Age: 53
End: 2023-01-26

## 2023-01-26 NOTE — TELEPHONE ENCOUNTER
----- Message from Joceline Kelly sent at 1/26/2023 10:26 AM EST -----  Subject: Appointment Request    Reason for Call: Established Patient Appointment needed: Urgent (Patient   Request) Back or Neck Pain    QUESTIONS    Reason for appointment request? Available appointments did not meet   patient need     Additional Information for Provider? Patient iis requesting a PCP visit   for constant back pain. Patient has not seen PCP since 8/23/2022 and would   like to get a referral for a specialist. Please contact patient to   schedule appointment.  He is open to an in office visit or virtual.   ---------------------------------------------------------------------------  --------------  Melissa HALL  [phone]; OK to leave message on [employer],OK to respond with electronic   message via [facility] portal (only for patients who have registered [facility]   account)  ---------------------------------------------------------------------------  --------------  SCRIPT ANSWERS  COVID Screen: Claudette Stark

## 2023-01-31 ENCOUNTER — OFFICE VISIT (OUTPATIENT)
Dept: PRIMARY CARE CLINIC | Age: 53
End: 2023-01-31
Payer: COMMERCIAL

## 2023-01-31 VITALS
HEART RATE: 90 BPM | BODY MASS INDEX: 32.76 KG/M2 | DIASTOLIC BLOOD PRESSURE: 88 MMHG | HEIGHT: 75 IN | TEMPERATURE: 97.4 F | WEIGHT: 263.5 LBS | SYSTOLIC BLOOD PRESSURE: 140 MMHG | OXYGEN SATURATION: 98 %

## 2023-01-31 DIAGNOSIS — M54.41 ACUTE MIDLINE LOW BACK PAIN WITH BILATERAL SCIATICA: Primary | ICD-10-CM

## 2023-01-31 DIAGNOSIS — M54.42 ACUTE MIDLINE LOW BACK PAIN WITH BILATERAL SCIATICA: Primary | ICD-10-CM

## 2023-01-31 PROCEDURE — 3077F SYST BP >= 140 MM HG: CPT | Performed by: FAMILY MEDICINE

## 2023-01-31 PROCEDURE — 3079F DIAST BP 80-89 MM HG: CPT | Performed by: FAMILY MEDICINE

## 2023-01-31 PROCEDURE — 99213 OFFICE O/P EST LOW 20 MIN: CPT | Performed by: FAMILY MEDICINE

## 2023-01-31 RX ORDER — KETOROLAC TROMETHAMINE 30 MG/ML
60 INJECTION, SOLUTION INTRAMUSCULAR; INTRAVENOUS ONCE
Status: COMPLETED | OUTPATIENT
Start: 2023-01-31 | End: 2023-01-31

## 2023-01-31 RX ORDER — CYCLOBENZAPRINE HCL 10 MG
10 TABLET ORAL NIGHTLY PRN
Qty: 30 TABLET | Refills: 0 | Status: SHIPPED | OUTPATIENT
Start: 2023-01-31 | End: 2023-02-10

## 2023-01-31 RX ADMIN — KETOROLAC TROMETHAMINE 60 MG: 30 INJECTION, SOLUTION INTRAMUSCULAR; INTRAVENOUS at 14:27

## 2023-01-31 ASSESSMENT — ENCOUNTER SYMPTOMS
BACK PAIN: 1
DIARRHEA: 0
CONSTIPATION: 0
ABDOMINAL PAIN: 0
NAUSEA: 0
VOMITING: 0
COUGH: 0
WHEEZING: 0
SHORTNESS OF BREATH: 0

## 2023-01-31 ASSESSMENT — PATIENT HEALTH QUESTIONNAIRE - PHQ9
SUM OF ALL RESPONSES TO PHQ QUESTIONS 1-9: 0
2. FEELING DOWN, DEPRESSED OR HOPELESS: 0
1. LITTLE INTEREST OR PLEASURE IN DOING THINGS: 0
SUM OF ALL RESPONSES TO PHQ9 QUESTIONS 1 & 2: 0
SUM OF ALL RESPONSES TO PHQ QUESTIONS 1-9: 0

## 2023-01-31 NOTE — PROGRESS NOTES
23  Mitchell Kat : 1970 Sex: male  Age: 46 y.o. Chief Complaint   Patient presents with    Back Pain     First noticed last wednesday and then re-injured yesterday after reaching down, severe, sharp, stabbing, burning and shooting, mostly constant with spasms     HPI:  46 y.o. male presents today for acute visit due to back pain. Patient's chart, medical, surgical and medication history all reviewed. Back Pain  46 y.o. male presents today with complaint of back pain. The pain started 4 day(s) ago. The patient denies any trauma or injury. Was bending over at home and when he went to stand up, felt like he had a \"blow torch\" on his back. The patient has been using Ibuprofen at home with some relief of their symptoms. The pain is worse with flexion. He admits numbness, tingling, but no weakness to the extremities. He denies any saddle anesthesia. No bowel or bladder incontinence. No fever or chills. No dysuria, urinary, frequency, or gross hematuria. No abdominal pain, nausea, vomiting, or diarrhea. No history of kidney stones. ROS:  Review of Systems   Constitutional:  Negative for chills, fatigue and fever. Respiratory:  Negative for cough, shortness of breath and wheezing. Cardiovascular:  Negative for chest pain and palpitations. Gastrointestinal:  Negative for abdominal pain, constipation, diarrhea, nausea and vomiting. Musculoskeletal:  Positive for back pain. Negative for arthralgias. Skin:  Negative for rash. Neurological:  Negative for dizziness and headaches. Psychiatric/Behavioral:  Negative for dysphoric mood. The patient is not nervous/anxious. All other systems reviewed and are negative.    Current Outpatient Medications on File Prior to Visit   Medication Sig Dispense Refill    montelukast (SINGULAIR) 10 MG tablet TAKE 1 TABLET NIGHTLY 90 tablet 3    losartan (COZAAR) 50 MG tablet Take 1 tablet by mouth daily 90 tablet 3    simvastatin (ZOCOR) 40 MG tablet TAKE 1 TABLET NIGHTLY 90 tablet 3    Multiple Vitamins-Minerals (THERAPEUTIC MULTIVITAMIN-MINERALS) tablet Take 1 tablet by mouth daily.       No current facility-administered medications on file prior to visit.       No Known Allergies    Past Medical History:   Diagnosis Date    Cervical (neck) region somatic dysfunction 2010    3 HERNIATED DISKS & HAS HAD CORTIZONE INJECTIONS    Hyperlipidemia     Hypertension     Sinusitis      Past Surgical History:   Procedure Laterality Date    CARDIAC CATHETERIZATION  2004    LITHOTRIPSY  2005    NASAL SINUS SURGERY      OTHER SURGICAL HISTORY  9/18/2014    FESS [facility] turbinator    SALIVARY GLAND SURGERY  2001    VASECTOMY  JAN 2014     History reviewed. No pertinent family history.  Social History     Socioeconomic History    Marital status:      Spouse name: Not on file    Number of children: Not on file    Years of education: Not on file    Highest education level: Not on file   Occupational History    Not on file   Tobacco Use    Smoking status: Never    Smokeless tobacco: Never   Substance and Sexual Activity    Alcohol use: Yes     Comment: occasional    Drug use: No    Sexual activity: Not on file   Other Topics Concern    Not on file   Social History Narrative    Not on file     Social Determinants of Health     Financial Resource Strain: Low Risk     Difficulty of Paying Living Expenses: Not hard at all   Food Insecurity: No Food Insecurity    Worried About Running Out of Food in the Last Year: Never true    Ran Out of Food in the Last Year: Never true   Transportation Needs: Not on file   Physical Activity: Not on file   Stress: Not on file   Social Connections: Not on file   Intimate Partner Violence: Not on file   Housing Stability: Not on file       Vitals:    01/31/23 1343 01/31/23 1355   BP: (!) 146/90 (!) 140/88   Site: Left Upper Arm Left Upper Arm   Position: Standing Sitting   Cuff Size: Medium Adult Medium Adult   Pulse: 90    Temp: 97.4  °F (36.3 °C)    SpO2: 98%    Weight: 263 lb 8 oz (119.5 kg)    Height: 6' 3\" (1.905 m)        Physical Exam:  Physical Exam  Vitals and nursing note reviewed. Constitutional:       General: He is not in acute distress. Appearance: Normal appearance. He is well-developed. He is obese. He is not ill-appearing (patient appears very uncomfortable). HENT:      Head: Normocephalic and atraumatic. Right Ear: Hearing and external ear normal.      Left Ear: Hearing and external ear normal.      Nose: Nose normal.      Mouth/Throat:      Mouth: Mucous membranes are moist.   Eyes:      General: Lids are normal. No scleral icterus. Extraocular Movements: Extraocular movements intact. Conjunctiva/sclera: Conjunctivae normal.   Neck:      Thyroid: No thyromegaly. Cardiovascular:      Rate and Rhythm: Normal rate and regular rhythm. Heart sounds: Normal heart sounds. No murmur heard. Pulmonary:      Effort: Pulmonary effort is normal. No respiratory distress. Breath sounds: Normal breath sounds. No wheezing. Musculoskeletal:         General: No tenderness or deformity. Normal range of motion. Cervical back: Normal range of motion and neck supple. Right lower leg: No edema. Left lower leg: No edema. Lymphadenopathy:      Cervical: No cervical adenopathy. Skin:     General: Skin is warm and dry. Findings: No rash. Neurological:      General: No focal deficit present. Mental Status: He is alert and oriented to person, place, and time. Gait: Gait abnormal (slowed, antalgic). Psychiatric:         Mood and Affect: Mood and affect normal.         Speech: Speech normal.         Behavior: Behavior normal.         Thought Content:  Thought content normal.       Labs:  CBC with Differential:    Lab Results   Component Value Date/Time    WBC 9.0 08/26/2022 01:20 PM    RBC 4.75 08/26/2022 01:20 PM    HGB 14.4 08/26/2022 01:20 PM    HCT 44.1 08/26/2022 01:20 PM     08/26/2022 01:20 PM    MCV 92.8 08/26/2022 01:20 PM    MCH 30.3 08/26/2022 01:20 PM    MCHC 32.7 08/26/2022 01:20 PM    RDW 13.0 08/26/2022 01:20 PM    LYMPHOPCT 27.9 08/26/2022 01:20 PM    MONOPCT 10.2 08/26/2022 01:20 PM    EOSPCT 2.8 05/15/2021 12:00 AM    BASOPCT 0.3 08/26/2022 01:20 PM    MONOSABS 0.92 08/26/2022 01:20 PM    LYMPHSABS 2.52 08/26/2022 01:20 PM    EOSABS 0.11 08/26/2022 01:20 PM    BASOSABS 0.03 08/26/2022 01:20 PM     CMP:    Lab Results   Component Value Date/Time     08/26/2022 01:20 PM    K 4.3 08/26/2022 01:20 PM     08/26/2022 01:20 PM    CO2 26 08/26/2022 01:20 PM    BUN 11 08/26/2022 01:20 PM    CREATININE 1.0 08/26/2022 01:20 PM    GFRAA >60 08/26/2022 01:20 PM    LABGLOM >60 08/26/2022 01:20 PM    GLUCOSE 92 08/26/2022 01:20 PM    PROT 7.5 08/26/2022 01:20 PM    LABALBU 4.1 08/26/2022 01:20 PM    CALCIUM 9.5 08/26/2022 01:20 PM    BILITOT 0.6 08/26/2022 01:20 PM    ALKPHOS 78 08/26/2022 01:20 PM    AST 20 08/26/2022 01:20 PM    ALT 20 08/26/2022 01:20 PM     U/A:    Lab Results   Component Value Date/Time    COLORU Yellow 08/26/2022 01:22 PM    PROTEINU Negative 08/26/2022 01:22 PM    PHUR 6.0 08/26/2022 01:22 PM    WBCUA NONE 05/21/2021 10:51 AM    RBCUA NONE 05/21/2021 10:51 AM    BACTERIA NONE SEEN 05/21/2021 10:51 AM    CLARITYU Clear 08/26/2022 01:22 PM    SPECGRAV 1.010 08/26/2022 01:22 PM    LEUKOCYTESUR Negative 08/26/2022 01:22 PM    UROBILINOGEN 0.2 08/26/2022 01:22 PM    BILIRUBINUR Negative 08/26/2022 01:22 PM    BILIRUBINUR Negative 05/15/2021 12:00 AM    BLOODU Negative 08/26/2022 01:22 PM    GLUCOSEU Negative 08/26/2022 01:22 PM     HgBA1c:    Lab Results   Component Value Date/Time    LABA1C 5.5 08/26/2022 01:20 PM     Microalbumen/Creatinine ratio:  No components found for: RUCREAT  FLP:    Lab Results   Component Value Date/Time    TRIG 67 08/26/2022 01:20 PM    HDL 47 08/26/2022 01:20 PM    LDLCALC 72 08/26/2022 01:20 PM    LABVLDL 13 08/26/2022 01:20 PM TSH:    Lab Results   Component Value Date/Time    TSH 1.210 08/26/2022 01:20 PM     PSA:   Lab Results   Component Value Date/Time    PSA 0.39 08/26/2022 01:20 PM        Assessment and Plan:  Alyssa Barnes was seen today for back pain. Diagnoses and all orders for this visit:    Acute midline low back pain with bilateral sciatica  -     XR LUMBAR SPINE (MIN 4 VIEWS); Future  -     ketorolac (TORADOL) injection 60 mg  -     dexamethasone (DECADRON) 0.75 MG tablet; Take 1 tablet by mouth 3 times daily for 5 days  -     cyclobenzaprine (FLEXERIL) 10 MG tablet; Take 1 tablet by mouth nightly as needed for Muscle spasms  -     XR LUMBAR SPINE FLEXION AND EXTENSION ONLY; Future  Highly suspicious for herniated disc. Toradol now. Check xray. Treat with steroids and muscle relaxer acutely. Exercises given. If no better, needs MRI. Return if symptoms worsen or fail to improve.       Seen By:  Harley Strong DO

## 2023-02-08 DIAGNOSIS — E78.2 MIXED HYPERLIPIDEMIA: ICD-10-CM

## 2023-02-08 RX ORDER — SIMVASTATIN 40 MG
TABLET ORAL
Qty: 90 TABLET | Refills: 3 | Status: SHIPPED | OUTPATIENT
Start: 2023-02-08

## 2023-03-06 DIAGNOSIS — I10 BENIGN ESSENTIAL HYPERTENSION: ICD-10-CM

## 2023-03-06 RX ORDER — LOSARTAN POTASSIUM 50 MG/1
TABLET ORAL
Qty: 90 TABLET | Refills: 3 | Status: SHIPPED | OUTPATIENT
Start: 2023-03-06

## 2023-05-15 RX ORDER — MONTELUKAST SODIUM 10 MG/1
TABLET ORAL
Qty: 90 TABLET | Refills: 3 | Status: SHIPPED | OUTPATIENT
Start: 2023-05-15

## 2023-05-18 ENCOUNTER — OFFICE VISIT (OUTPATIENT)
Dept: FAMILY MEDICINE CLINIC | Age: 53
End: 2023-05-18
Payer: COMMERCIAL

## 2023-05-18 VITALS
DIASTOLIC BLOOD PRESSURE: 80 MMHG | OXYGEN SATURATION: 97 % | HEIGHT: 75 IN | HEART RATE: 90 BPM | SYSTOLIC BLOOD PRESSURE: 126 MMHG | TEMPERATURE: 98 F | BODY MASS INDEX: 30.84 KG/M2 | WEIGHT: 248 LBS

## 2023-05-18 DIAGNOSIS — I10 BENIGN ESSENTIAL HYPERTENSION: ICD-10-CM

## 2023-05-18 DIAGNOSIS — M79.89 RIGHT LEG SWELLING: Primary | ICD-10-CM

## 2023-05-18 DIAGNOSIS — I82.411 ACUTE DEEP VEIN THROMBOSIS (DVT) OF FEMORAL VEIN OF RIGHT LOWER EXTREMITY (HCC): ICD-10-CM

## 2023-05-18 DIAGNOSIS — R73.01 IMPAIRED FASTING GLUCOSE: ICD-10-CM

## 2023-05-18 DIAGNOSIS — E55.9 VITAMIN D DEFICIENCY: ICD-10-CM

## 2023-05-18 DIAGNOSIS — Z00.00 ENCOUNTER FOR WELL ADULT EXAM WITHOUT ABNORMAL FINDINGS: Primary | ICD-10-CM

## 2023-05-18 PROCEDURE — 3079F DIAST BP 80-89 MM HG: CPT | Performed by: PHYSICIAN ASSISTANT

## 2023-05-18 PROCEDURE — 3074F SYST BP LT 130 MM HG: CPT | Performed by: PHYSICIAN ASSISTANT

## 2023-05-18 PROCEDURE — 99204 OFFICE O/P NEW MOD 45 MIN: CPT | Performed by: PHYSICIAN ASSISTANT

## 2023-05-18 NOTE — PROGRESS NOTES
23  Shahzad Burleson : 1970 Sex: male  Age 46 y.o. Subjective:  Chief Complaint   Patient presents with    Leg Swelling     Right leg flew to [facility]  has been having leg swelling. Pt states that it is bigger than other leg. HPI:   Shahzad Burleson , 46 y.o. male presents to express care for evaluation of right leg swelling, pain    HPI  55-year-old male presents to express care for evaluation of right lower extremity swelling. The patient has had the symptoms ongoing for the last week. The patient states that he recently flew to [facility] 2023 and returned on 2023. When he initially got there he also drove about 300 miles. The patient is not experiencing any chest pain, shortness of breath. The patient noted that the right leg was more swollen than the left. He does have a history of varicose veins. The patient is not having any syncope or hemoptysis. No previous history of DVT. ROS:   Unless otherwise stated in this report the patient's positive and negative responses for review of systems for constitutional, eyes, ENT, cardiovascular, respiratory, gastrointestinal, neurological, , musculoskeletal, and integument systems and related systems to the presenting problem are either stated in the history of present illness or were not pertinent or were negative for the symptoms and/or complaints related to the presenting medical problem. Positives and pertinent negatives as per HPI. All others reviewed and are negative.       PMH:     Past Medical History:   Diagnosis Date    Cervical (neck) region somatic dysfunction     3 HERNIATED DISKS & HAS HAD CORTIZONE INJECTIONS    Hyperlipidemia     Hypertension     Sinusitis        Past Surgical History:   Procedure Laterality Date    CARDIAC CATHETERIZATION  2004    LITHOTRIPSY  2005    NASAL SINUS SURGERY      OTHER SURGICAL HISTORY  2014    FESS [facility] turbinator    SALIVARY GLAND SURGERY

## 2023-05-19 ENCOUNTER — TELEPHONE (OUTPATIENT)
Dept: PRIMARY CARE CLINIC | Age: 53
End: 2023-05-19

## 2023-05-19 NOTE — TELEPHONE ENCOUNTER
apixaban starter pack (ELIQUIS) 5 MG TBPK tablet  Medication needing PA per pharmacy. [phone]    Patient will need called with update. Said he tried to  script yesterday, but was told there was a doctor into the doctor. Called pharmacy to see what was needed from us and a PA is required for medication.

## 2023-05-19 NOTE — TELEPHONE ENCOUNTER
I called and did the prior authorization, I spoke with the pharmacy, could you let the patient know that he needs to start this right away

## 2023-05-22 ENCOUNTER — TELEPHONE (OUTPATIENT)
Dept: FAMILY MEDICINE CLINIC | Age: 53
End: 2023-05-22

## 2023-05-22 NOTE — TELEPHONE ENCOUNTER
Dr Opal Lyles had documented that she wanted to see patient in the office for a follow up. There was a cancellation for this Wednesday, patient is available. Appointment scheduled.

## 2023-05-22 NOTE — TELEPHONE ENCOUNTER
----- Message from Olvinelissa Rivera sent at 5/22/2023  2:43 PM EDT -----  Subject: Message to Provider    QUESTIONS  Information for Provider? Patient called and stated he was diagnosed with   a blood clot in is leg on 5/18 and stated medication for it on 5/19. Patient would like to know what his next steps are.   ---------------------------------------------------------------------------  --------------  Anastasiya HUNTER  [phone]; OK to leave message on voicemail  ---------------------------------------------------------------------------  --------------  SCRIPT ANSWERS  Relationship to Patient?  Self

## 2023-05-23 SDOH — ECONOMIC STABILITY: FOOD INSECURITY: WITHIN THE PAST 12 MONTHS, THE FOOD YOU BOUGHT JUST DIDN'T LAST AND YOU DIDN'T HAVE MONEY TO GET MORE.: NEVER TRUE

## 2023-05-23 SDOH — ECONOMIC STABILITY: FOOD INSECURITY: WITHIN THE PAST 12 MONTHS, YOU WORRIED THAT YOUR FOOD WOULD RUN OUT BEFORE YOU GOT MONEY TO BUY MORE.: NEVER TRUE

## 2023-05-23 SDOH — ECONOMIC STABILITY: INCOME INSECURITY: HOW HARD IS IT FOR YOU TO PAY FOR THE VERY BASICS LIKE FOOD, HOUSING, MEDICAL CARE, AND HEATING?: NOT HARD AT ALL

## 2023-05-23 SDOH — ECONOMIC STABILITY: HOUSING INSECURITY
IN THE LAST 12 MONTHS, WAS THERE A TIME WHEN YOU DID NOT HAVE A STEADY PLACE TO SLEEP OR SLEPT IN A SHELTER (INCLUDING NOW)?: NO

## 2023-05-23 SDOH — ECONOMIC STABILITY: TRANSPORTATION INSECURITY
IN THE PAST 12 MONTHS, HAS LACK OF TRANSPORTATION KEPT YOU FROM MEETINGS, WORK, OR FROM GETTING THINGS NEEDED FOR DAILY LIVING?: NO

## 2023-05-24 ENCOUNTER — OFFICE VISIT (OUTPATIENT)
Dept: PRIMARY CARE CLINIC | Age: 53
End: 2023-05-24
Payer: COMMERCIAL

## 2023-05-24 ENCOUNTER — TELEPHONE (OUTPATIENT)
Dept: VASCULAR SURGERY | Age: 53
End: 2023-05-24

## 2023-05-24 VITALS
HEIGHT: 75 IN | DIASTOLIC BLOOD PRESSURE: 80 MMHG | HEART RATE: 78 BPM | OXYGEN SATURATION: 98 % | WEIGHT: 244 LBS | TEMPERATURE: 98 F | BODY MASS INDEX: 30.34 KG/M2 | SYSTOLIC BLOOD PRESSURE: 114 MMHG

## 2023-05-24 DIAGNOSIS — I83.811 VARICOSE VEINS OF LEG WITH PAIN, RIGHT: ICD-10-CM

## 2023-05-24 DIAGNOSIS — I82.411 ACUTE DEEP VEIN THROMBOSIS (DVT) OF FEMORAL VEIN OF RIGHT LOWER EXTREMITY (HCC): Primary | ICD-10-CM

## 2023-05-24 PROCEDURE — 3079F DIAST BP 80-89 MM HG: CPT | Performed by: FAMILY MEDICINE

## 2023-05-24 PROCEDURE — 99213 OFFICE O/P EST LOW 20 MIN: CPT | Performed by: FAMILY MEDICINE

## 2023-05-24 PROCEDURE — 3074F SYST BP LT 130 MM HG: CPT | Performed by: FAMILY MEDICINE

## 2023-05-24 ASSESSMENT — ENCOUNTER SYMPTOMS
VOMITING: 0
SHORTNESS OF BREATH: 0
CONSTIPATION: 0
NAUSEA: 0
COUGH: 0
ABDOMINAL PAIN: 0
DIARRHEA: 0
WHEEZING: 0
BACK PAIN: 0

## 2023-05-24 NOTE — PROGRESS NOTES
LYMPHSABS 2.52 08/26/2022 01:20 PM    EOSABS 0.11 08/26/2022 01:20 PM    BASOSABS 0.03 08/26/2022 01:20 PM     CMP:    Lab Results   Component Value Date/Time     08/26/2022 01:20 PM    K 4.3 08/26/2022 01:20 PM     08/26/2022 01:20 PM    CO2 26 08/26/2022 01:20 PM    BUN 11 08/26/2022 01:20 PM    CREATININE 1.0 08/26/2022 01:20 PM    GFRAA >60 08/26/2022 01:20 PM    LABGLOM >60 08/26/2022 01:20 PM    GLUCOSE 92 08/26/2022 01:20 PM    PROT 7.5 08/26/2022 01:20 PM    LABALBU 4.1 08/26/2022 01:20 PM    CALCIUM 9.5 08/26/2022 01:20 PM    BILITOT 0.6 08/26/2022 01:20 PM    ALKPHOS 78 08/26/2022 01:20 PM    AST 20 08/26/2022 01:20 PM    ALT 20 08/26/2022 01:20 PM     U/A:    Lab Results   Component Value Date/Time    COLORU Yellow 08/26/2022 01:22 PM    PROTEINU Negative 08/26/2022 01:22 PM    PHUR 6.0 08/26/2022 01:22 PM    WBCUA NONE 05/21/2021 10:51 AM    RBCUA NONE 05/21/2021 10:51 AM    BACTERIA NONE SEEN 05/21/2021 10:51 AM    CLARITYU Clear 08/26/2022 01:22 PM    SPECGRAV 1.010 08/26/2022 01:22 PM    LEUKOCYTESUR Negative 08/26/2022 01:22 PM    UROBILINOGEN 0.2 08/26/2022 01:22 PM    BILIRUBINUR Negative 08/26/2022 01:22 PM    BILIRUBINUR Negative 05/15/2021 12:00 AM    BLOODU Negative 08/26/2022 01:22 PM    GLUCOSEU Negative 08/26/2022 01:22 PM     HgBA1c:    Lab Results   Component Value Date/Time    LABA1C 5.5 08/26/2022 01:20 PM     FLP:    Lab Results   Component Value Date/Time    TRIG 67 08/26/2022 01:20 PM    HDL 47 08/26/2022 01:20 PM    [address] 72 08/26/2022 01:20 PM    LABVLDL 13 08/26/2022 01:20 PM     TSH:    Lab Results   Component Value Date/Time    TSH 1.210 08/26/2022 01:20 PM     PSA:   Lab Results   Component Value Date/Time    PSA 0.39 08/26/2022 01:20 PM        Assessment and Plan:  Daniela Deras was seen today for leg swelling.     Diagnoses and all orders for this visit:    Acute deep vein thrombosis (DVT) of femoral vein of right lower extremity (HCC)  -     apixaban (ELIQUIS) 5 MG

## 2023-05-31 ENCOUNTER — TELEPHONE (OUTPATIENT)
Dept: PRIMARY CARE CLINIC | Age: 53
End: 2023-05-31

## 2023-05-31 RX ORDER — METRONIDAZOLE 500 MG/1
500 TABLET ORAL 2 TIMES DAILY
Qty: 14 TABLET | Refills: 0 | Status: SHIPPED | OUTPATIENT
Start: 2023-05-31 | End: 2023-06-07

## 2023-05-31 RX ORDER — CIPROFLOXACIN 500 MG/1
500 TABLET, FILM COATED ORAL 2 TIMES DAILY
Qty: 14 TABLET | Refills: 0 | Status: SHIPPED | OUTPATIENT
Start: 2023-05-31 | End: 2023-06-07

## 2023-05-31 NOTE — TELEPHONE ENCOUNTER
Patient having bad diverticulitis flare up. On Eliquis right now and the only he can take OTC is Tylenol which is not helping.      Asking if you can call in antibiotics for him that you have called in the past     NII Ly

## 2023-07-17 ENCOUNTER — OFFICE VISIT (OUTPATIENT)
Dept: VASCULAR SURGERY | Age: 53
End: 2023-07-17
Payer: COMMERCIAL

## 2023-07-17 VITALS — BODY MASS INDEX: 29.59 KG/M2 | HEIGHT: 75 IN | WEIGHT: 238 LBS

## 2023-07-17 DIAGNOSIS — I82.411 ACUTE DEEP VEIN THROMBOSIS (DVT) OF FEMORAL VEIN OF RIGHT LOWER EXTREMITY (HCC): ICD-10-CM

## 2023-07-17 DIAGNOSIS — I83.91 ASYMPTOMATIC VARICOSE VEINS OF RIGHT LOWER EXTREMITY: Primary | ICD-10-CM

## 2023-07-17 PROCEDURE — 99204 OFFICE O/P NEW MOD 45 MIN: CPT | Performed by: PHYSICIAN ASSISTANT

## 2023-07-17 NOTE — PROGRESS NOTES
Vascular Surgery Outpatient Consultation    Reason for Consult:  Varicose Veins, RLE femoral DVT    PCP : Richard Hernandez DO    HISTORY OF PRESENT ILLNESS:    The patient is a 46 y.o. male who presents with concerns for varicose veins. The symptoms are right greater than left. He has no pain or swelling associated with the varicose veins. 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 The patient is using compression stockings intermittently. He states he is very physically active. He was diagnosed with a R SFV DVT 5/18/2023 after a long plane flight and travel to [facility]. He developed R leg swelling with mild aching pain following his trip and went to his PCP who ordered an [address] He has no prior history of DVT before this episode. He is on eliquis 5 mg BID for a total of 3 months therapy. His symptoms her had with the acute DVT have completely resolved.      ROS : All others Negative if blank [], Positive if [x]  General Urinary   [] Fevers [] Hematuria   [] Chills [] Dysuria   [] Weight Loss Vascular   Skin [] Claudication   [] Tissue Loss [] Rest Pain   Eyes Neurologic   [] Wears Glasses/Contacts [] Stroke/TIA   [] Vision Changes [] Focal weakness   Respiratory [] Slurred Speech    [] Shortness of breath ENT   Cardiovascular [] Difficulty swallowing   [] Chest Pain Endocrine    [] Shortness of breath with exertion [] Increased Thirst   Gastrointestinal    [] Abdominal Pain    [] Melena   [] Hematochezia         Past Medical History:        Diagnosis Date    Cervical (neck) region somatic dysfunction 2010    3 HERNIATED DISKS & HAS HAD CORTIZONE INJECTIONS    Hyperlipidemia     Hypertension     Sinusitis      Past Surgical History:        Procedure Laterality Date    CARDIAC CATHETERIZATION  2004    LITHOTRIPSY  2005    NASAL SINUS SURGERY      OTHER SURGICAL HISTORY  9/18/2014    FESS [facility] turbinator    SALIVARY GLAND SURGERY  2001    VASECTOMY  Hemant Wheatley

## 2023-08-07 DIAGNOSIS — I82.411 ACUTE DEEP VEIN THROMBOSIS (DVT) OF FEMORAL VEIN OF RIGHT LOWER EXTREMITY (HCC): ICD-10-CM

## 2023-08-07 RX ORDER — APIXABAN 5 MG/1
TABLET, FILM COATED ORAL
Qty: 60 TABLET | Refills: 0 | Status: SHIPPED | OUTPATIENT
Start: 2023-08-07

## 2023-08-25 ENCOUNTER — HOSPITAL ENCOUNTER (OUTPATIENT)
Dept: CARDIOLOGY | Age: 53
Discharge: HOME | End: 2023-08-25
Payer: COMMERCIAL

## 2023-08-25 DIAGNOSIS — I82.411 ACUTE DEEP VEIN THROMBOSIS (DVT) OF FEMORAL VEIN OF RIGHT LOWER EXTREMITY (HCC): ICD-10-CM

## 2023-08-25 PROCEDURE — 93971 EXTREMITY STUDY: CPT

## 2023-08-25 NOTE — PROGRESS NOTES
[place] [facility] - [facility]    This is a pre read worksheet - prior to official physician interpretationSameer Worrell    1970  Date of study: 8/25/23    Indication for study:  Leg pain and swelling  Study : Right Lower Extremity Venous Duplex Examination    Duplex examination of the common, deep, superficial femoral, and the popliteal veins of the RIGHT lower extremity identifies spontaneous flow. All scanned veins are compressible. Mid- distal SFV has small area of chronic- appearing, non- occlusive DVT. Additional comments: Negative acute DVT. Chronic- appearing, non- occlusive focal DVT in RIGHT mid- distal SFV.

## 2023-08-28 ENCOUNTER — TELEPHONE (OUTPATIENT)
Dept: VASCULAR SURGERY | Age: 53
End: 2023-08-28

## 2023-08-28 NOTE — TELEPHONE ENCOUNTER
US reviewed  Stable as compared to previous    Ok to stop eliquis  Start asa 81 mg daily lifetime    Discussed with pt    All ?  Debbie Officer, MD

## 2023-09-20 ENCOUNTER — TELEPHONE (OUTPATIENT)
Dept: PRIMARY CARE CLINIC | Age: 53
End: 2023-09-20

## 2023-09-27 ENCOUNTER — OFFICE VISIT (OUTPATIENT)
Dept: PRIMARY CARE CLINIC | Age: 53
End: 2023-09-27
Payer: COMMERCIAL

## 2023-09-27 VITALS
HEART RATE: 85 BPM | HEIGHT: 75 IN | BODY MASS INDEX: 30.84 KG/M2 | WEIGHT: 248 LBS | TEMPERATURE: 97.6 F | DIASTOLIC BLOOD PRESSURE: 70 MMHG | OXYGEN SATURATION: 98 % | SYSTOLIC BLOOD PRESSURE: 132 MMHG

## 2023-09-27 DIAGNOSIS — E78.2 MIXED HYPERLIPIDEMIA: ICD-10-CM

## 2023-09-27 DIAGNOSIS — G89.29 CHRONIC PAIN OF BOTH SHOULDERS: Primary | ICD-10-CM

## 2023-09-27 DIAGNOSIS — M25.511 CHRONIC PAIN OF BOTH SHOULDERS: Primary | ICD-10-CM

## 2023-09-27 DIAGNOSIS — M25.512 CHRONIC PAIN OF BOTH SHOULDERS: Primary | ICD-10-CM

## 2023-09-27 DIAGNOSIS — E55.9 VITAMIN D INSUFFICIENCY: ICD-10-CM

## 2023-09-27 DIAGNOSIS — Z00.00 ENCOUNTER FOR WELL ADULT EXAM WITHOUT ABNORMAL FINDINGS: Primary | ICD-10-CM

## 2023-09-27 DIAGNOSIS — I10 BENIGN ESSENTIAL HYPERTENSION: ICD-10-CM

## 2023-09-27 DIAGNOSIS — R73.01 IMPAIRED FASTING GLUCOSE: ICD-10-CM

## 2023-09-27 DIAGNOSIS — Z12.5 SCREENING FOR PROSTATE CANCER: ICD-10-CM

## 2023-09-27 PROCEDURE — 3074F SYST BP LT 130 MM HG: CPT | Performed by: FAMILY MEDICINE

## 2023-09-27 PROCEDURE — 3078F DIAST BP <80 MM HG: CPT | Performed by: FAMILY MEDICINE

## 2023-09-27 PROCEDURE — 99213 OFFICE O/P EST LOW 20 MIN: CPT | Performed by: FAMILY MEDICINE

## 2023-09-27 RX ORDER — PREDNISONE 10 MG/1
TABLET ORAL
Qty: 30 TABLET | Refills: 0 | Status: SHIPPED | OUTPATIENT
Start: 2023-09-27 | End: 2023-10-08

## 2023-09-27 NOTE — PATIENT INSTRUCTIONS
Scapular Stabilizers:   Strengthening Exercises:    Shoulder blade squeeze    Stand with your arms at your sides, and squeeze your shoulder blades together. Do not raise your shoulders up as you squeeze. Hold 6 seconds. Repeat 8 to 12 times. Scapular exercise: Arm reach    Lie flat on your back. This exercise is a very slight motion that starts with your arms raised (elbows straight, arms straight). From this position, reach higher toward the clare or ceiling. Keep your elbows straight. All motion should be from your shoulder blade only. Relax your arms back to where you started. Repeat 8 to 12 times. Shoulder flexor and extensor exercise isometric      Push forward (flex): Stand facing a wall or doorjamb, about 6 inches or less back. Hold your injured arm against your body. Make a closed fist with your thumb on top. Then gently push your hand forward into the wall with about 25% to 50% of your strength. Don't let your body move backward as you push. Hold for about 6 seconds. Relax for a few seconds. Repeat 8 to 12 times. Push backward (extend): Stand with your back flat against a wall. Your upper arm should be against the wall, with your elbow bent 90 degrees (your hand straight ahead). Push your elbow gently back against the wall with about 25% to 50% of your strength. Don't let your body move forward as you push. Hold for about 6 seconds. Relax for a few seconds. Repeat 8 to 12 times. Scapular exercise: Wall push-ups    Note: This exercise is best done with your fingers somewhat turned out, rather than straight up and down. Stand facing a wall, about 12 inches to 18 inches away. Place your hands on the wall at shoulder height. Slowly bend your elbows and bring your face to the wall. Keep your back and hips straight. Push back to where you started. Repeat 8 to 12 times. When you can do this exercise against a wall comfortably, you can try it against a counter.  You can then

## 2023-10-01 LAB
ALBUMIN SERPL-MCNC: 4.6 G/DL
ALP BLD-CCNC: 64 U/L
ALT SERPL-CCNC: 27 U/L
ANION GAP SERPL CALCULATED.3IONS-SCNC: NORMAL MMOL/L
AST SERPL-CCNC: 20 U/L
AVERAGE GLUCOSE: NORMAL
BASOPHILS ABSOLUTE: 30 /ΜL
BASOPHILS RELATIVE PERCENT: 0.3 %
BILIRUB SERPL-MCNC: 0.6 MG/DL (ref 0.1–1.4)
BILIRUBIN, URINE: NEGATIVE
BLOOD, URINE: NEGATIVE
BUN BLDV-MCNC: 19 MG/DL
CALCIUM SERPL-MCNC: 9.7 MG/DL
CHLORIDE BLD-SCNC: 105 MMOL/L
CHOLESTEROL, TOTAL: 172 MG/DL
CHOLESTEROL/HDL RATIO: 3.4
CLARITY: CLEAR
CO2: 29 MMOL/L
COLOR: YELLOW
CREAT SERPL-MCNC: 0.82 MG/DL
EGFR: 105
EOSINOPHILS ABSOLUTE: 0 /ΜL
EOSINOPHILS RELATIVE PERCENT: 0 %
GLUCOSE BLD-MCNC: 108 MG/DL
GLUCOSE URINE: NORMAL
HBA1C MFR BLD: 5.5 %
HCT VFR BLD CALC: 44.7 % (ref 41–53)
HDLC SERPL-MCNC: 50 MG/DL (ref 35–70)
HEMOGLOBIN: 15.1 G/DL (ref 13.5–17.5)
KETONES, URINE: NEGATIVE
LDL CHOLESTEROL CALCULATED: 107 MG/DL (ref 0–160)
LEUKOCYTE ESTERASE, URINE: NEGATIVE
LYMPHOCYTES ABSOLUTE: 2313 /ΜL
LYMPHOCYTES RELATIVE PERCENT: 22.9 %
MCH RBC QN AUTO: 30.3 PG
MCHC RBC AUTO-ENTMCNC: 33.8 G/DL
MCV RBC AUTO: 89.6 FL
MONOCYTES ABSOLUTE: 717 /ΜL
MONOCYTES RELATIVE PERCENT: 7.1 %
NEUTROPHILS ABSOLUTE: 7040 /ΜL
NEUTROPHILS RELATIVE PERCENT: 69.7 %
NITRITE, URINE: NEGATIVE
NONHDLC SERPL-MCNC: 122 MG/DL
PDW BLD-RTO: 13.4 %
PH UA: 6.5 (ref 4.5–8)
PLATELET # BLD: 255 K/ΜL
PMV BLD AUTO: 9.2 FL
POTASSIUM SERPL-SCNC: 4.1 MMOL/L
PROSTATE SPECIFIC ANTIGEN: 0.28 NG/ML
PROTEIN UA: NEGATIVE
RBC # BLD: 4.99 10^6/ΜL
SODIUM BLD-SCNC: 141 MMOL/L
SPECIFIC GRAVITY, URINE: 1.01
TOTAL PROTEIN: 7.4
TRIGL SERPL-MCNC: 67 MG/DL
TSH SERPL DL<=0.05 MIU/L-ACNC: 0.49 UIU/ML
URIC ACID: 6.6
UROBILINOGEN, URINE: NORMAL
VITAMIN D 25-HYDROXY: 32
VITAMIN D2, 25 HYDROXY: NORMAL
VITAMIN D3,25 HYDROXY: NORMAL
VLDLC SERPL CALC-MCNC: NORMAL MG/DL
WBC # BLD: 10.1 10^3/ML

## 2023-10-01 ASSESSMENT — ENCOUNTER SYMPTOMS
DIARRHEA: 0
BACK PAIN: 0
WHEEZING: 0
CONSTIPATION: 0
COUGH: 0
NAUSEA: 0
VOMITING: 0
ABDOMINAL PAIN: 0
SHORTNESS OF BREATH: 0

## 2023-10-02 DIAGNOSIS — Z00.00 ENCOUNTER FOR WELL ADULT EXAM WITHOUT ABNORMAL FINDINGS: ICD-10-CM

## 2023-10-02 DIAGNOSIS — R73.01 IMPAIRED FASTING GLUCOSE: ICD-10-CM

## 2023-10-02 DIAGNOSIS — Z12.5 SCREENING FOR PROSTATE CANCER: ICD-10-CM

## 2023-10-02 DIAGNOSIS — I10 BENIGN ESSENTIAL HYPERTENSION: ICD-10-CM

## 2023-10-02 DIAGNOSIS — E55.9 VITAMIN D INSUFFICIENCY: ICD-10-CM

## 2023-10-02 DIAGNOSIS — E78.2 MIXED HYPERLIPIDEMIA: ICD-10-CM

## 2024-02-05 DIAGNOSIS — E78.2 MIXED HYPERLIPIDEMIA: ICD-10-CM

## 2024-02-05 RX ORDER — SIMVASTATIN 40 MG
TABLET ORAL
Qty: 90 TABLET | Refills: 3 | Status: SHIPPED | OUTPATIENT
Start: 2024-02-05

## 2024-02-28 DIAGNOSIS — I10 BENIGN ESSENTIAL HYPERTENSION: ICD-10-CM

## 2024-02-28 RX ORDER — LOSARTAN POTASSIUM 50 MG/1
TABLET ORAL
Qty: 90 TABLET | Refills: 3 | Status: SHIPPED | OUTPATIENT
Start: 2024-02-28

## 2024-08-26 RX ORDER — MONTELUKAST SODIUM 10 MG/1
10 TABLET ORAL NIGHTLY
Qty: 90 TABLET | Refills: 0 | Status: SHIPPED | OUTPATIENT
Start: 2024-08-26

## 2024-09-30 RX ORDER — MONTELUKAST SODIUM 10 MG/1
10 TABLET ORAL NIGHTLY
Qty: 90 TABLET | Refills: 3 | Status: SHIPPED
Start: 2024-09-30 | End: 2024-09-30 | Stop reason: SDUPTHER

## 2024-09-30 RX ORDER — MONTELUKAST SODIUM 10 MG/1
10 TABLET ORAL NIGHTLY
Qty: 30 TABLET | Refills: 0 | Status: SHIPPED | OUTPATIENT
Start: 2024-09-30

## 2025-01-02 RX ORDER — MONTELUKAST SODIUM 10 MG/1
10 TABLET ORAL NIGHTLY
Qty: 90 TABLET | Refills: 3 | Status: SHIPPED | OUTPATIENT
Start: 2025-01-02

## 2025-01-02 NOTE — TELEPHONE ENCOUNTER
Name of Medication(s) Requested:  Requested Prescriptions     Pending Prescriptions Disp Refills    montelukast (SINGULAIR) 10 MG tablet 30 tablet 0     Sig: Take 1 tablet by mouth nightly       Medication is on current medication list Yes    Dosage and directions were verified? Yes    Quantity verified: 30 day supply     Pharmacy Verified?  Yes    Last Appointment:  9/27/2023    Future appts:  No future appointments.     (If no appt send self scheduling link. .[email or web address])  Scheduling request sent?     [] Yes  [] No    Does patient need updated?  [] Yes  [] No

## 2025-01-27 RX ORDER — PANTOPRAZOLE SODIUM 40 MG/1
40 TABLET, DELAYED RELEASE ORAL
Qty: 90 TABLET | Refills: 0 | Status: SHIPPED | OUTPATIENT
Start: 2025-01-27

## 2025-01-28 DIAGNOSIS — E78.2 MIXED HYPERLIPIDEMIA: ICD-10-CM

## 2025-01-28 RX ORDER — SIMVASTATIN 40 MG
TABLET ORAL
Qty: 90 TABLET | Refills: 3 | Status: SHIPPED | OUTPATIENT
Start: 2025-01-28

## 2025-02-17 ENCOUNTER — OFFICE VISIT (OUTPATIENT)
Dept: PRIMARY CARE CLINIC | Age: 55
End: 2025-02-17
Payer: COMMERCIAL

## 2025-02-17 VITALS
SYSTOLIC BLOOD PRESSURE: 131 MMHG | WEIGHT: 263 LBS | HEART RATE: 74 BPM | OXYGEN SATURATION: 98 % | BODY MASS INDEX: 32.7 KG/M2 | DIASTOLIC BLOOD PRESSURE: 84 MMHG | HEIGHT: 75 IN | RESPIRATION RATE: 18 BRPM | TEMPERATURE: 97.8 F

## 2025-02-17 DIAGNOSIS — J02.9 SORE THROAT: ICD-10-CM

## 2025-02-17 DIAGNOSIS — J20.8 VIRAL BRONCHITIS: Primary | ICD-10-CM

## 2025-02-17 DIAGNOSIS — R05.1 ACUTE COUGH: ICD-10-CM

## 2025-02-17 LAB
INFLUENZA A ANTIBODY: NEGATIVE
INFLUENZA B ANTIBODY: NEGATIVE
Lab: NORMAL
PERFORMING INSTRUMENT: NORMAL
QC PASS/FAIL: NORMAL
S PYO AG THROAT QL: NORMAL
SARS-COV-2, POC: NORMAL

## 2025-02-17 PROCEDURE — 87880 STREP A ASSAY W/OPTIC: CPT | Performed by: NURSE PRACTITIONER

## 2025-02-17 PROCEDURE — 99214 OFFICE O/P EST MOD 30 MIN: CPT | Performed by: NURSE PRACTITIONER

## 2025-02-17 PROCEDURE — 87804 INFLUENZA ASSAY W/OPTIC: CPT | Performed by: NURSE PRACTITIONER

## 2025-02-17 PROCEDURE — 87426 SARSCOV CORONAVIRUS AG IA: CPT | Performed by: NURSE PRACTITIONER

## 2025-02-17 PROCEDURE — 3075F SYST BP GE 130 - 139MM HG: CPT | Performed by: NURSE PRACTITIONER

## 2025-02-17 PROCEDURE — 3079F DIAST BP 80-89 MM HG: CPT | Performed by: NURSE PRACTITIONER

## 2025-02-17 RX ORDER — ALBUTEROL SULFATE 90 UG/1
2 INHALANT RESPIRATORY (INHALATION) EVERY 4 HOURS PRN
Qty: 1 EACH | Refills: 0 | Status: SHIPPED | OUTPATIENT
Start: 2025-02-17

## 2025-02-17 RX ORDER — METHYLPREDNISOLONE 4 MG/1
TABLET ORAL
Qty: 1 KIT | Refills: 0 | Status: SHIPPED | OUTPATIENT
Start: 2025-02-17

## 2025-02-17 RX ORDER — BENZONATATE 100 MG/1
CAPSULE ORAL
Qty: 30 CAPSULE | Refills: 0 | Status: SHIPPED | OUTPATIENT
Start: 2025-02-17

## 2025-02-17 NOTE — PROGRESS NOTES
25  Sameer Skaggs : 1970 Sex: male  Age 54 y.o.    Subjective:  Chief Complaint   Patient presents with    Congestion    Tinnitus    Shortness of Breath       HPI:   Sameer Skaggs , 54 y.o. male presents to the clinic for evaluation of cough x 3 days. The patient also reports sinus drainage, chest congestion, HEARD, wheezing, sore throat. The patient has taken Mucinex for symptoms. The patient reports unchanged symptoms over time. The patient denies known ill exposure. Denies headache, rash, and fever. Denies chest pain, abdominal pain, and nausea / vomiting / diarrhea.    ROS:   Unless otherwise stated in this report the patient's positive and negative responses for review of systems for constitutional, eyes, ENT, cardiovascular, respiratory, gastrointestinal, neurological, , musculoskeletal, and integument systems and related systems to the presenting problem are either stated in the history of present illness or were not pertinent or were negative for the symptoms and/or complaints related to the presenting medical problem.  Positives and pertinent negatives as per HPI.  All others reviewed and are negative.      PMH:     Past Medical History:   Diagnosis Date    Cervical (neck) region somatic dysfunction     3 HERNIATED DISKS & HAS HAD CORTIZONE INJECTIONS    Hyperlipidemia     Hypertension     Sinusitis        Past Surgical History:   Procedure Laterality Date    CARDIAC CATHETERIZATION      LITHOTRIPSY      NASAL SINUS SURGERY      OTHER SURGICAL HISTORY  2014    FESS [facility] turbinator    SALIVARY GLAND SURGERY      VASECTOMY  2014       History reviewed. No pertinent family history.    Medications:     Current Outpatient Medications:     methylPREDNISolone (MEDROL DOSEPACK) 4 MG tablet, Take by mouth., Disp: 1 kit, Rfl: 0    albuterol sulfate HFA (PROVENTIL;VENTOLIN;PROAIR) 108 (90 Base) MCG/ACT inhaler, Inhale 2 puffs into the lungs every 4 hours as needed

## 2025-02-24 DIAGNOSIS — I10 BENIGN ESSENTIAL HYPERTENSION: ICD-10-CM

## 2025-02-24 RX ORDER — LOSARTAN POTASSIUM 50 MG/1
TABLET ORAL
Qty: 90 TABLET | Refills: 0 | Status: SHIPPED | OUTPATIENT
Start: 2025-02-24

## 2025-02-24 NOTE — TELEPHONE ENCOUNTER
90 days sent.  Patient has not been in the office since 2023.  Needs appt before further refills will be sent

## 2025-04-24 RX ORDER — PANTOPRAZOLE SODIUM 40 MG/1
40 TABLET, DELAYED RELEASE ORAL
Qty: 90 TABLET | Refills: 0 | Status: SHIPPED | OUTPATIENT
Start: 2025-04-24

## 2025-05-03 SDOH — ECONOMIC STABILITY: FOOD INSECURITY: WITHIN THE PAST 12 MONTHS, YOU WORRIED THAT YOUR FOOD WOULD RUN OUT BEFORE YOU GOT MONEY TO BUY MORE.: NEVER TRUE

## 2025-05-03 SDOH — ECONOMIC STABILITY: TRANSPORTATION INSECURITY
IN THE PAST 12 MONTHS, HAS THE LACK OF TRANSPORTATION KEPT YOU FROM MEDICAL APPOINTMENTS OR FROM GETTING MEDICATIONS?: NO

## 2025-05-03 SDOH — ECONOMIC STABILITY: INCOME INSECURITY: IN THE LAST 12 MONTHS, WAS THERE A TIME WHEN YOU WERE NOT ABLE TO PAY THE MORTGAGE OR RENT ON TIME?: NO

## 2025-05-03 SDOH — ECONOMIC STABILITY: FOOD INSECURITY: WITHIN THE PAST 12 MONTHS, THE FOOD YOU BOUGHT JUST DIDN'T LAST AND YOU DIDN'T HAVE MONEY TO GET MORE.: NEVER TRUE

## 2025-05-03 ASSESSMENT — PATIENT HEALTH QUESTIONNAIRE - PHQ9
2. FEELING DOWN, DEPRESSED OR HOPELESS: NOT AT ALL
SUM OF ALL RESPONSES TO PHQ9 QUESTIONS 1 & 2: 0
1. LITTLE INTEREST OR PLEASURE IN DOING THINGS: NOT AT ALL
1. LITTLE INTEREST OR PLEASURE IN DOING THINGS: NOT AT ALL
SUM OF ALL RESPONSES TO PHQ QUESTIONS 1-9: 0
2. FEELING DOWN, DEPRESSED OR HOPELESS: NOT AT ALL
SUM OF ALL RESPONSES TO PHQ QUESTIONS 1-9: 0

## 2025-05-06 ENCOUNTER — OFFICE VISIT (OUTPATIENT)
Dept: PRIMARY CARE CLINIC | Age: 55
End: 2025-05-06
Payer: COMMERCIAL

## 2025-05-06 VITALS
OXYGEN SATURATION: 97 % | BODY MASS INDEX: 32.98 KG/M2 | TEMPERATURE: 97.2 F | HEIGHT: 74 IN | SYSTOLIC BLOOD PRESSURE: 122 MMHG | HEART RATE: 70 BPM | DIASTOLIC BLOOD PRESSURE: 84 MMHG | WEIGHT: 257 LBS

## 2025-05-06 DIAGNOSIS — Z00.00 ENCOUNTER FOR WELL ADULT EXAM WITHOUT ABNORMAL FINDINGS: Primary | ICD-10-CM

## 2025-05-06 DIAGNOSIS — E55.9 VITAMIN D DEFICIENCY: ICD-10-CM

## 2025-05-06 DIAGNOSIS — K21.9 GASTROESOPHAGEAL REFLUX DISEASE WITHOUT ESOPHAGITIS: ICD-10-CM

## 2025-05-06 DIAGNOSIS — R73.01 IMPAIRED FASTING GLUCOSE: ICD-10-CM

## 2025-05-06 DIAGNOSIS — K57.31 DIVERTICULOSIS LARGE INTESTINE W/O PERFORATION OR ABSCESS W/BLEEDING: ICD-10-CM

## 2025-05-06 DIAGNOSIS — Z12.5 SCREENING FOR PROSTATE CANCER: ICD-10-CM

## 2025-05-06 DIAGNOSIS — I10 BENIGN ESSENTIAL HYPERTENSION: ICD-10-CM

## 2025-05-06 DIAGNOSIS — E78.2 MIXED HYPERLIPIDEMIA: ICD-10-CM

## 2025-05-06 PROBLEM — I83.91 ASYMPTOMATIC VARICOSE VEINS OF RIGHT LOWER EXTREMITY: Status: RESOLVED | Noted: 2023-07-17 | Resolved: 2025-05-06

## 2025-05-06 PROBLEM — I82.411 ACUTE DEEP VEIN THROMBOSIS (DVT) OF FEMORAL VEIN OF RIGHT LOWER EXTREMITY (HCC): Status: RESOLVED | Noted: 2023-07-17 | Resolved: 2025-05-06

## 2025-05-06 PROCEDURE — 99396 PREV VISIT EST AGE 40-64: CPT | Performed by: FAMILY MEDICINE

## 2025-05-06 PROCEDURE — 3074F SYST BP LT 130 MM HG: CPT | Performed by: FAMILY MEDICINE

## 2025-05-06 PROCEDURE — 3079F DIAST BP 80-89 MM HG: CPT | Performed by: FAMILY MEDICINE

## 2025-05-06 RX ORDER — PANTOPRAZOLE SODIUM 40 MG/1
40 TABLET, DELAYED RELEASE ORAL
Qty: 90 TABLET | Refills: 3 | Status: SHIPPED | OUTPATIENT
Start: 2025-05-06

## 2025-05-06 ASSESSMENT — ENCOUNTER SYMPTOMS
NAUSEA: 0
SHORTNESS OF BREATH: 0
ABDOMINAL PAIN: 0
DIARRHEA: 0
COUGH: 0
VOMITING: 0
BACK PAIN: 0
WHEEZING: 0

## 2025-05-06 NOTE — ASSESSMENT & PLAN NOTE
Orders:    Lipid Panel; Future    
     Orders:    Vitamin D 25 Hydroxy; Future    
 Chronic, at goal (stable), continue current treatment plan         
 Chronic, at goal (stable), continue current treatment plan    Orders:    CBC with Auto Differential; Future    Comprehensive Metabolic Panel; Future    Lipid Panel; Future    TSH; Future    Urinalysis; Future    Magnesium; Future    
Admission

## 2025-05-06 NOTE — PROGRESS NOTES
25  Sameer Skaggs : 1970 Sex: male  Age: 54 y.o.    Chief Complaint   Patient presents with    Annual Exam     HPI:  54 y.o. male presents today for yearly physical.  Patient's chart, medical, surgical and medication history all reviewed.    Well Adult Physical  Patient here for a physical exam.  The patient reports no problems.  Still occasionally with diverticulosis symptoms, but controls with diet.     Do you take any herbs or supplements that were not prescribed by a doctor? no   Are you taking calcium supplements? no   Are you taking aspirin daily? no    Colonoscopy: Date of last Colonoscopy: 2021   No cologuard on file  No FIT/FOBT on file   No flexible sigmoidoscopy on file  Dental visit: Within last 6 mos  Vision check:  No Problems    Last time routine bloodwork was done:      Immunization status: missing doses of Shingles.    Smoking status: never    Physical activity:  infrequently      ROS:  Review of Systems   Constitutional:  Negative for chills, fatigue and fever.   Respiratory:  Negative for cough, shortness of breath and wheezing.    Cardiovascular:  Negative for chest pain and palpitations.   Gastrointestinal:  Negative for abdominal pain, constipation, diarrhea, nausea and vomiting.   Musculoskeletal:  Positive for arthralgias. Negative for back pain.   Skin:  Negative for rash.   Neurological:  Negative for dizziness and headaches.   Psychiatric/Behavioral:  Negative for dysphoric mood. The patient is not nervous/anxious.    All other systems reviewed and are negative.     Current Outpatient Medications on File Prior to Visit   Medication Sig Dispense Refill    losartan (COZAAR) 50 MG tablet TAKE 1 TABLET DAILY 90 tablet 0    simvastatin (ZOCOR) 40 MG tablet TAKE 1 TABLET NIGHTLY 90 tablet 3    montelukast (SINGULAIR) 10 MG tablet Take 1 tablet by mouth nightly 90 tablet 3    sildenafil (REVATIO) 20 MG tablet Take 1 tablet by mouth daily as needed (ED) 90 tablet 1

## 2025-05-17 ENCOUNTER — HOSPITAL ENCOUNTER (OUTPATIENT)
Age: 55
Discharge: HOME | End: 2025-05-17
Payer: COMMERCIAL

## 2025-05-17 DIAGNOSIS — I10 BENIGN ESSENTIAL HYPERTENSION: ICD-10-CM

## 2025-05-17 DIAGNOSIS — R73.01 IMPAIRED FASTING GLUCOSE: ICD-10-CM

## 2025-05-17 DIAGNOSIS — Z12.5 SCREENING FOR PROSTATE CANCER: ICD-10-CM

## 2025-05-17 DIAGNOSIS — E78.2 MIXED HYPERLIPIDEMIA: ICD-10-CM

## 2025-05-17 DIAGNOSIS — Z00.00 ENCOUNTER FOR WELL ADULT EXAM WITHOUT ABNORMAL FINDINGS: ICD-10-CM

## 2025-05-17 DIAGNOSIS — E55.9 VITAMIN D DEFICIENCY: ICD-10-CM

## 2025-05-17 LAB
25(OH)D3 SERPL-MCNC: 30.2 NG/ML (ref 30–100)
ALBUMIN SERPL-MCNC: 4.2 G/DL (ref 3.5–5.2)
ALP SERPL-CCNC: 76 U/L (ref 40–129)
ALT SERPL-CCNC: 31 U/L (ref 0–50)
ANION GAP SERPL CALCULATED.3IONS-SCNC: 9 MMOL/L (ref 7–16)
AST SERPL-CCNC: 29 U/L (ref 0–50)
BASOPHILS # BLD: 0.04 K/UL (ref 0–0.2)
BASOPHILS NFR BLD: 1 % (ref 0–2)
BILIRUB SERPL-MCNC: 0.7 MG/DL (ref 0–1.2)
BUN SERPL-MCNC: 13 MG/DL (ref 6–20)
CALCIUM SERPL-MCNC: 9.6 MG/DL (ref 8.6–10)
CHLORIDE SERPL-SCNC: 104 MMOL/L (ref 98–107)
CHOLEST SERPL-MCNC: 132 MG/DL
CO2 SERPL-SCNC: 25 MMOL/L (ref 22–29)
CREAT SERPL-MCNC: 1 MG/DL (ref 0.7–1.2)
EOSINOPHIL # BLD: 0.15 K/UL (ref 0.05–0.5)
EOSINOPHILS RELATIVE PERCENT: 3 % (ref 0–6)
ERYTHROCYTE [DISTWIDTH] IN BLOOD BY AUTOMATED COUNT: 13.1 % (ref 11.5–15)
GFR, ESTIMATED: >90 ML/MIN/1.73M2
GLUCOSE SERPL-MCNC: 116 MG/DL (ref 74–99)
HBA1C MFR BLD: 5.7 % (ref 4–5.6)
HCT VFR BLD AUTO: 45.6 % (ref 37–54)
HDLC SERPL-MCNC: 43 MG/DL
HGB BLD-MCNC: 15.4 G/DL (ref 12.5–16.5)
IMM GRANULOCYTES # BLD AUTO: <0.03 K/UL (ref 0–0.58)
IMM GRANULOCYTES NFR BLD: 0 % (ref 0–5)
LDLC SERPL CALC-MCNC: 75 MG/DL
LYMPHOCYTES NFR BLD: 1.93 K/UL (ref 1.5–4)
LYMPHOCYTES RELATIVE PERCENT: 36 % (ref 20–42)
MAGNESIUM SERPL-MCNC: 2.1 MG/DL (ref 1.6–2.6)
MCH RBC QN AUTO: 30.6 PG (ref 26–35)
MCHC RBC AUTO-ENTMCNC: 33.8 G/DL (ref 32–34.5)
MCV RBC AUTO: 90.7 FL (ref 80–99.9)
MONOCYTES NFR BLD: 0.57 K/UL (ref 0.1–0.95)
MONOCYTES NFR BLD: 11 % (ref 2–12)
NEUTROPHILS NFR BLD: 50 % (ref 43–80)
NEUTS SEG NFR BLD: 2.74 K/UL (ref 1.8–7.3)
PLATELET # BLD AUTO: 207 K/UL (ref 130–450)
PMV BLD AUTO: 9 FL (ref 7–12)
POTASSIUM SERPL-SCNC: 4.6 MMOL/L (ref 3.5–5.1)
PROT SERPL-MCNC: 7.2 G/DL (ref 6.4–8.3)
PSA SERPL-MCNC: 0.29 NG/ML (ref 0–4)
RBC # BLD AUTO: 5.03 M/UL (ref 3.8–5.8)
SODIUM SERPL-SCNC: 138 MMOL/L (ref 136–145)
TRIGL SERPL-MCNC: 74 MG/DL
TSH SERPL DL<=0.05 MIU/L-ACNC: 1.4 UIU/ML (ref 0.27–4.2)
VLDLC SERPL CALC-MCNC: 15 MG/DL
WBC OTHER # BLD: 5.4 K/UL (ref 4.5–11.5)

## 2025-05-17 PROCEDURE — 80061 LIPID PANEL: CPT

## 2025-05-17 PROCEDURE — 83036 HEMOGLOBIN GLYCOSYLATED A1C: CPT

## 2025-05-17 PROCEDURE — 82306 VITAMIN D 25 HYDROXY: CPT

## 2025-05-17 PROCEDURE — 83735 ASSAY OF MAGNESIUM: CPT

## 2025-05-17 PROCEDURE — 80053 COMPREHEN METABOLIC PANEL: CPT

## 2025-05-17 PROCEDURE — 36415 COLL VENOUS BLD VENIPUNCTURE: CPT

## 2025-05-17 PROCEDURE — 84443 ASSAY THYROID STIM HORMONE: CPT

## 2025-05-17 PROCEDURE — 85025 COMPLETE CBC W/AUTO DIFF WBC: CPT

## 2025-05-17 PROCEDURE — G0103 PSA SCREENING: HCPCS

## 2025-05-19 ENCOUNTER — RESULTS FOLLOW-UP (OUTPATIENT)
Dept: PRIMARY CARE CLINIC | Age: 55
End: 2025-05-19

## 2025-05-23 DIAGNOSIS — I10 BENIGN ESSENTIAL HYPERTENSION: ICD-10-CM

## 2025-05-23 RX ORDER — LOSARTAN POTASSIUM 50 MG/1
50 TABLET ORAL DAILY
Qty: 90 TABLET | Refills: 0 | Status: SHIPPED | OUTPATIENT
Start: 2025-05-23

## 2025-07-09 ENCOUNTER — TELEPHONE (OUTPATIENT)
Dept: PRIMARY CARE CLINIC | Age: 55
End: 2025-07-09

## 2025-08-19 ENCOUNTER — TELEPHONE (OUTPATIENT)
Dept: PRIMARY CARE CLINIC | Age: 55
End: 2025-08-19

## 2025-08-19 DIAGNOSIS — Z12.11 SCREENING FOR COLON CANCER: Primary | ICD-10-CM

## 2025-08-19 DIAGNOSIS — K57.31 DIVERTICULOSIS LARGE INTESTINE W/O PERFORATION OR ABSCESS W/BLEEDING: ICD-10-CM
